# Patient Record
Sex: FEMALE | Race: WHITE | Employment: FULL TIME | URBAN - METROPOLITAN AREA
[De-identification: names, ages, dates, MRNs, and addresses within clinical notes are randomized per-mention and may not be internally consistent; named-entity substitution may affect disease eponyms.]

---

## 2017-02-10 ENCOUNTER — TELEPHONE (OUTPATIENT)
Dept: INTERNAL MEDICINE CLINIC | Facility: CLINIC | Age: 47
End: 2017-02-10

## 2017-02-10 DIAGNOSIS — R74.8 ELEVATED LIVER ENZYMES: Primary | ICD-10-CM

## 2017-02-25 ENCOUNTER — LAB ENCOUNTER (OUTPATIENT)
Dept: LAB | Age: 47
End: 2017-02-25
Attending: FAMILY MEDICINE
Payer: COMMERCIAL

## 2017-02-25 DIAGNOSIS — R74.8 ELEVATED LIVER ENZYMES: ICD-10-CM

## 2017-02-25 LAB
ALT SERPL-CCNC: 134 U/L (ref 14–54)
AST SERPL-CCNC: 84 U/L (ref 15–41)
CHOLEST SMN-MCNC: 264 MG/DL (ref ?–200)
HDLC SERPL-MCNC: 78 MG/DL (ref 45–?)
HDLC SERPL: 3.38 {RATIO} (ref ?–4.44)
LDLC SERPL CALC-MCNC: 162 MG/DL (ref ?–130)
NONHDLC SERPL-MCNC: 186 MG/DL (ref ?–130)
TRIGLYCERIDES: 119 MG/DL (ref ?–150)
VLDL: 24 MG/DL (ref 5–40)

## 2017-02-25 PROCEDURE — 84450 TRANSFERASE (AST) (SGOT): CPT

## 2017-02-25 PROCEDURE — 80061 LIPID PANEL: CPT

## 2017-02-25 PROCEDURE — 36415 COLL VENOUS BLD VENIPUNCTURE: CPT

## 2017-02-25 PROCEDURE — 84460 ALANINE AMINO (ALT) (SGPT): CPT

## 2017-04-03 PROBLEM — K76.0 FATTY LIVER: Status: ACTIVE | Noted: 2017-04-03

## 2017-04-15 ENCOUNTER — APPOINTMENT (OUTPATIENT)
Dept: LAB | Age: 47
End: 2017-04-15
Payer: COMMERCIAL

## 2017-04-15 DIAGNOSIS — R74.01 ELEVATED TRANSAMINASE LEVEL: ICD-10-CM

## 2017-04-15 PROCEDURE — 83516 IMMUNOASSAY NONANTIBODY: CPT

## 2017-04-15 PROCEDURE — 82390 ASSAY OF CERULOPLASMIN: CPT

## 2017-04-15 PROCEDURE — 83540 ASSAY OF IRON: CPT

## 2017-04-15 PROCEDURE — 83550 IRON BINDING TEST: CPT

## 2017-04-15 PROCEDURE — 36415 COLL VENOUS BLD VENIPUNCTURE: CPT

## 2017-04-15 PROCEDURE — 86038 ANTINUCLEAR ANTIBODIES: CPT

## 2017-04-15 PROCEDURE — 82728 ASSAY OF FERRITIN: CPT

## 2017-04-15 PROCEDURE — 84432 ASSAY OF THYROGLOBULIN: CPT

## 2017-05-08 ENCOUNTER — TELEPHONE (OUTPATIENT)
Dept: INTERNAL MEDICINE CLINIC | Facility: CLINIC | Age: 47
End: 2017-05-08

## 2017-05-08 DIAGNOSIS — E78.00 HYPERCHOLESTEREMIA: ICD-10-CM

## 2017-05-08 DIAGNOSIS — R74.8 ELEVATED LIVER ENZYMES: Primary | ICD-10-CM

## 2017-05-08 DIAGNOSIS — E03.9 HYPOTHYROIDISM, UNSPECIFIED TYPE: ICD-10-CM

## 2017-05-09 NOTE — TELEPHONE ENCOUNTER
Pt informed Wants to know if can repeat TSH when having lipids done in 3 months? Will be doing hepatic panel in a week for Dr Marla Ríos.

## 2017-05-13 ENCOUNTER — APPOINTMENT (OUTPATIENT)
Dept: LAB | Age: 47
End: 2017-05-13
Attending: INTERNAL MEDICINE
Payer: COMMERCIAL

## 2017-05-13 DIAGNOSIS — R74.8 ELEVATED LIVER ENZYMES: ICD-10-CM

## 2017-05-13 DIAGNOSIS — R74.01 ELEVATED TRANSAMINASE LEVEL: ICD-10-CM

## 2017-05-13 PROCEDURE — 36415 COLL VENOUS BLD VENIPUNCTURE: CPT

## 2017-05-13 PROCEDURE — 82728 ASSAY OF FERRITIN: CPT

## 2017-05-13 PROCEDURE — 80076 HEPATIC FUNCTION PANEL: CPT

## 2017-07-08 ENCOUNTER — HOSPITAL ENCOUNTER (OUTPATIENT)
Dept: MAMMOGRAPHY | Age: 47
Discharge: HOME | End: 2017-07-08
Attending: OBSTETRICS & GYNECOLOGY
Payer: COMMERCIAL

## 2017-07-08 DIAGNOSIS — Z12.31 VISIT FOR SCREENING MAMMOGRAM: ICD-10-CM

## 2017-07-08 PROCEDURE — 77067 SCR MAMMO BI INCL CAD: CPT | Performed by: OBSTETRICS & GYNECOLOGY

## 2017-07-08 PROCEDURE — 77063 BREAST TOMOSYNTHESIS BI: CPT | Performed by: OBSTETRICS & GYNECOLOGY

## 2017-07-10 ENCOUNTER — OFFICE VISIT (OUTPATIENT)
Dept: INTERNAL MEDICINE CLINIC | Facility: CLINIC | Age: 47
End: 2017-07-10

## 2017-07-10 VITALS
SYSTOLIC BLOOD PRESSURE: 120 MMHG | DIASTOLIC BLOOD PRESSURE: 82 MMHG | OXYGEN SATURATION: 98 % | HEART RATE: 71 BPM | WEIGHT: 180 LBS | BODY MASS INDEX: 32 KG/M2 | TEMPERATURE: 99 F | RESPIRATION RATE: 12 BRPM

## 2017-07-10 DIAGNOSIS — H65.01 RIGHT ACUTE SEROUS OTITIS MEDIA, RECURRENCE NOT SPECIFIED: Primary | ICD-10-CM

## 2017-07-10 DIAGNOSIS — R09.82 POST-NASAL DRIP: ICD-10-CM

## 2017-07-10 PROCEDURE — 99213 OFFICE O/P EST LOW 20 MIN: CPT | Performed by: FAMILY MEDICINE

## 2017-07-10 RX ORDER — AZITHROMYCIN 250 MG/1
TABLET, FILM COATED ORAL
Qty: 6 TABLET | Refills: 0 | Status: SHIPPED | OUTPATIENT
Start: 2017-07-10 | End: 2017-12-21

## 2017-07-10 NOTE — PROGRESS NOTES
CHIEF COMPLAINT:   Patient presents with:  Ear Pain: Bilateral ear pain and sore throat since Saturday (7/8/17). No fever. Using Advil. Last Thursday and Friday with headaches.        HPI:   Keri Cuevas is a 48year old female who presents for upper bunionectomy  3/2014: DANIELE BIOPSY STEREOTACTIC NODULE 1 SITE RIGHT  3/2014: DANIELE BIOPSY STEREOTACTIC NODULE 2 SITE BILAT  No date: OTHER SURGICAL HISTORY      Comment: Cervix Cryosurgery, d/t abnormal pap  No date: TONSILLECTOMY      Smoking status: Former S Prescriptions Disp Refills    azithromycin (ZITHROMAX Z-HARLEY) 250 MG Oral Tab 6 tablet 0      Sig: Take two tablets by mouth today, then one tablet daily. Imaging & Consults:  None  Pt to start the above medication. Motrin for pain.  Claritin for t

## 2017-07-17 RX ORDER — LEVOTHYROXINE SODIUM 0.1 MG/1
TABLET ORAL
Qty: 90 TABLET | Refills: 1 | Status: SHIPPED | OUTPATIENT
Start: 2017-07-17 | End: 2017-09-14 | Stop reason: DRUGHIGH

## 2017-08-31 ENCOUNTER — APPOINTMENT (OUTPATIENT)
Dept: LAB | Age: 47
End: 2017-08-31
Attending: FAMILY MEDICINE
Payer: COMMERCIAL

## 2017-08-31 DIAGNOSIS — E03.9 HYPOTHYROIDISM, UNSPECIFIED TYPE: ICD-10-CM

## 2017-08-31 DIAGNOSIS — E78.00 HYPERCHOLESTEREMIA: ICD-10-CM

## 2017-08-31 DIAGNOSIS — R74.8 ELEVATED LIVER ENZYMES: ICD-10-CM

## 2017-08-31 DIAGNOSIS — K76.0 FATTY LIVER: ICD-10-CM

## 2017-08-31 LAB
ALBUMIN SERPL-MCNC: 4.1 G/DL (ref 3.5–4.8)
ALP LIVER SERPL-CCNC: 51 U/L (ref 39–100)
ALT SERPL-CCNC: 86 U/L (ref 14–54)
AST SERPL-CCNC: 42 U/L (ref 15–41)
BILIRUB DIRECT SERPL-MCNC: 0.1 MG/DL (ref 0.1–0.5)
BILIRUB SERPL-MCNC: 0.8 MG/DL (ref 0.1–2)
CHOLEST SMN-MCNC: 259 MG/DL (ref ?–200)
HDLC SERPL-MCNC: 78 MG/DL (ref 45–?)
HDLC SERPL: 3.32 {RATIO} (ref ?–4.44)
LDLC SERPL CALC-MCNC: 164 MG/DL (ref ?–130)
LDLC SERPL-MCNC: 17 MG/DL (ref 5–40)
M PROTEIN MFR SERPL ELPH: 7.3 G/DL (ref 6.1–8.3)
NONHDLC SERPL-MCNC: 181 MG/DL (ref ?–130)
TRIGLYCERIDES: 87 MG/DL (ref ?–150)
TSI SER-ACNC: 5.38 MIU/ML (ref 0.35–5.5)

## 2017-08-31 PROCEDURE — 80076 HEPATIC FUNCTION PANEL: CPT

## 2017-08-31 PROCEDURE — 36415 COLL VENOUS BLD VENIPUNCTURE: CPT

## 2017-08-31 PROCEDURE — 80061 LIPID PANEL: CPT

## 2017-08-31 PROCEDURE — 84443 ASSAY THYROID STIM HORMONE: CPT

## 2017-09-14 ENCOUNTER — TELEPHONE (OUTPATIENT)
Dept: INTERNAL MEDICINE CLINIC | Facility: CLINIC | Age: 47
End: 2017-09-14

## 2017-09-14 DIAGNOSIS — E03.9 PRIMARY HYPOTHYROIDISM: Primary | ICD-10-CM

## 2017-09-14 RX ORDER — LEVOTHYROXINE SODIUM 112 UG/1
112 TABLET ORAL
Qty: 30 TABLET | Refills: 1 | Status: SHIPPED | OUTPATIENT
Start: 2017-09-14 | End: 2017-11-08

## 2017-09-14 NOTE — TELEPHONE ENCOUNTER
Patient informed of lab result and order of 9/2017, she verbalized understanding, new dosage of levothyroxine sent to [facility]. Patient verbalized understanding

## 2017-10-05 ENCOUNTER — TELEPHONE (OUTPATIENT)
Dept: INTERNAL MEDICINE CLINIC | Facility: CLINIC | Age: 47
End: 2017-10-05

## 2017-10-05 NOTE — TELEPHONE ENCOUNTER
Patient needs labs done again for cholesterol and thyroid and the orders are in. Patient would like to know does she need to continue taking medication for 60 days.  Please call patient back

## 2017-11-10 RX ORDER — LEVOTHYROXINE SODIUM 112 UG/1
112 TABLET ORAL
Qty: 30 TABLET | Refills: 0 | Status: SHIPPED | OUTPATIENT
Start: 2017-11-10 | End: 2017-11-14 | Stop reason: DRUGHIGH

## 2017-11-11 ENCOUNTER — APPOINTMENT (OUTPATIENT)
Dept: LAB | Age: 47
End: 2017-11-11
Attending: FAMILY MEDICINE
Payer: COMMERCIAL

## 2017-11-11 DIAGNOSIS — E03.9 PRIMARY HYPOTHYROIDISM: ICD-10-CM

## 2017-11-11 PROCEDURE — 36415 COLL VENOUS BLD VENIPUNCTURE: CPT

## 2017-11-11 PROCEDURE — 84443 ASSAY THYROID STIM HORMONE: CPT

## 2017-11-13 ENCOUNTER — TELEPHONE (OUTPATIENT)
Dept: INTERNAL MEDICINE CLINIC | Facility: CLINIC | Age: 47
End: 2017-11-13

## 2017-11-13 DIAGNOSIS — E03.9 HYPOTHYROIDISM, UNSPECIFIED TYPE: Primary | ICD-10-CM

## 2017-11-14 ENCOUNTER — TELEPHONE (OUTPATIENT)
Dept: INTERNAL MEDICINE CLINIC | Facility: CLINIC | Age: 47
End: 2017-11-14

## 2017-11-14 RX ORDER — LEVOTHYROXINE SODIUM 0.1 MG/1
100 TABLET ORAL DAILY
Qty: 30 TABLET | Refills: 1 | Status: SHIPPED | OUTPATIENT
Start: 2017-11-14 | End: 2018-01-02

## 2017-11-14 NOTE — TELEPHONE ENCOUNTER
Please callback to discuss dosage decision on thyroid med from Dr Gregory Meraz from last night conversation.   Patient has not taken it yet today

## 2017-11-14 NOTE — TELEPHONE ENCOUNTER
----- Message from Mitra Saldaña MD sent at 11/12/2017  8:19 PM CST -----  Now the dose is a bit high  Any tremors, palpitations?

## 2018-01-24 ENCOUNTER — HOSPITAL ENCOUNTER (OUTPATIENT)
Dept: GENERAL RADIOLOGY | Age: 48
Discharge: HOME | End: 2018-01-24
Attending: FAMILY MEDICINE
Payer: COMMERCIAL

## 2018-01-24 ENCOUNTER — OFFICE VISIT (OUTPATIENT)
Dept: INTERNAL MEDICINE CLINIC | Facility: CLINIC | Age: 48
End: 2018-01-24

## 2018-01-24 ENCOUNTER — TELEPHONE (OUTPATIENT)
Dept: INTERNAL MEDICINE CLINIC | Facility: CLINIC | Age: 48
End: 2018-01-24

## 2018-01-24 VITALS
TEMPERATURE: 98 F | RESPIRATION RATE: 16 BRPM | WEIGHT: 181.25 LBS | BODY MASS INDEX: 32 KG/M2 | OXYGEN SATURATION: 95 % | SYSTOLIC BLOOD PRESSURE: 114 MMHG | HEART RATE: 83 BPM | DIASTOLIC BLOOD PRESSURE: 82 MMHG

## 2018-01-24 DIAGNOSIS — M25.561 ACUTE PAIN OF RIGHT KNEE: ICD-10-CM

## 2018-01-24 DIAGNOSIS — M25.561 ACUTE PAIN OF RIGHT KNEE: Primary | ICD-10-CM

## 2018-01-24 PROCEDURE — 99213 OFFICE O/P EST LOW 20 MIN: CPT | Performed by: FAMILY MEDICINE

## 2018-01-24 PROCEDURE — 73560 X-RAY EXAM OF KNEE 1 OR 2: CPT | Performed by: FAMILY MEDICINE

## 2018-01-24 RX ORDER — HYDROCODONE BITARTRATE AND ACETAMINOPHEN 7.5; 325 MG/1; MG/1
1 TABLET ORAL EVERY 6 HOURS PRN
Qty: 20 TABLET | Refills: 0 | Status: SHIPPED | OUTPATIENT
Start: 2018-01-24 | End: 2018-10-30 | Stop reason: ALTCHOICE

## 2018-01-24 NOTE — PROGRESS NOTES
Patient presents with:  Knee Pain: Right knee pain since yesterday afternoon; right knee popped at work. Icing area and using Ibuprofen 600mg q 6 hrs. HPI:   Brina Dobbs is a 50year old female present with complain of right knee pain.   Onset: Yaquelin Angel rashes  RESPIRATORY: denies shortness of breath with exertion  CARDIOVASCULAR: denies chest pain on exertion  GI: denies abdominal pain and denies heartburn  NEURO: denies headaches  Musculoskeletal:  pain as described above.  See HPI     EXAM:   /82

## 2018-01-25 PROBLEM — M22.41 CHONDROMALACIA OF RIGHT PATELLA: Status: ACTIVE | Noted: 2018-01-25

## 2018-01-25 PROBLEM — M25.461 EFFUSION OF RIGHT KNEE: Status: ACTIVE | Noted: 2018-01-25

## 2018-02-04 ENCOUNTER — APPOINTMENT (OUTPATIENT)
Dept: ULTRASOUND IMAGING | Age: 48
End: 2018-02-04
Attending: FAMILY MEDICINE
Payer: COMMERCIAL

## 2018-02-04 ENCOUNTER — HOSPITAL ENCOUNTER (OUTPATIENT)
Age: 48
Discharge: HOME | End: 2018-02-04
Attending: FAMILY MEDICINE
Payer: COMMERCIAL

## 2018-02-04 VITALS
HEART RATE: 68 BPM | DIASTOLIC BLOOD PRESSURE: 73 MMHG | OXYGEN SATURATION: 97 % | TEMPERATURE: 99 F | RESPIRATION RATE: 16 BRPM | SYSTOLIC BLOOD PRESSURE: 128 MMHG

## 2018-02-04 DIAGNOSIS — M25.461 EFFUSION OF RIGHT KNEE: Primary | ICD-10-CM

## 2018-02-04 DIAGNOSIS — M79.89 RIGHT LEG SWELLING: ICD-10-CM

## 2018-02-04 LAB
#LYMPHOCYTE IC: 1.8 X10ˆ3/UL (ref 0.9–3.2)
#MXD IC: 0.5 X10ˆ3/UL (ref 0.1–1)
#NEUTROPHIL IC: 4 X10ˆ3/UL (ref 1.3–6.7)
CREAT SERPL-MCNC: 0.7 MG/DL (ref 0.55–1.02)
GLUCOSE BLD-MCNC: 79 MG/DL (ref 70–99)
HCT IC: 40.7 % (ref 37–54)
HGB IC: 13.4 G/DL (ref 11.7–16)
ISTAT BLOOD GAS TCO2: 25 MMOL/L (ref 22–32)
ISTAT BUN: 8 MG/DL (ref 8–20)
ISTAT CHLORIDE: 102 MMOL/L (ref 101–111)
ISTAT HEMATOCRIT: 41 % (ref 34–50)
ISTAT IONIZED CALCIUM: 1.14 MMOL/L (ref 1.12–1.32)
ISTAT POTASSIUM: 3.8 MMOL/L (ref 3.6–5.1)
ISTAT SODIUM: 138 MMOL/L (ref 136–144)
LYMPHOCYTES NFR BLD AUTO: 28.3 %
MCH IC: 31.2 PG (ref 27–33.2)
MCHC IC: 32.9 G/DL (ref 31–37)
MCV IC: 94.7 FL (ref 81–100)
MIXED CELL %: 8.4 %
NEUTROPHILS NFR BLD AUTO: 63.3 %
PLT IC: 196 X10ˆ3/UL (ref 150–450)
RBC IC: 4.3 X10ˆ6/UL (ref 3.8–5.1)
WBC IC: 6.3 X10ˆ3/UL (ref 4–13)

## 2018-02-04 PROCEDURE — 80047 BASIC METABLC PNL IONIZED CA: CPT

## 2018-02-04 PROCEDURE — 36415 COLL VENOUS BLD VENIPUNCTURE: CPT

## 2018-02-04 PROCEDURE — 93971 EXTREMITY STUDY: CPT | Performed by: FAMILY MEDICINE

## 2018-02-04 PROCEDURE — 99214 OFFICE O/P EST MOD 30 MIN: CPT

## 2018-02-04 PROCEDURE — 85025 COMPLETE CBC W/AUTO DIFF WBC: CPT | Performed by: FAMILY MEDICINE

## 2018-02-04 NOTE — ED PROVIDER NOTES
Patient Seen in: [facility]    History   Patient presents with:  Knee Pain    Stated Complaint: RIGHT KNEE AND SWOLLEN    HPI  51 yo F here with complaints of R knee pain and swelling and swelling of the whole leg - appears \"li All other systems reviewed and negative except as noted above.     Physical Exam   ED Triage Vitals [02/04/18 1126]  BP: 130/74  Pulse: 63  Resp: 18  Temp: 98.8 °F (37.1 °C)  Temp src: Oral  SpO2: 97 %  O2 Device: None (Room air)    Current:/73 dislocation.   Dictated by: Leah Wallace MD on 1/24/2018 at 13:51     Approved by: Leah Wallace MD            Us Venous Doppler Leg Right - Diag Img (cpt=93971)    Result Date: 2/4/2018  PROCEDURE:  US VENOUS DOPPLER LEG RIGHT - DIAG IMG (CPT=93971) Paulo Parrish [address] [address]  [phone]    In 2 days  For reassessment of symptoms         Medications Prescribed:  Discharge Medication List as of 2/4/2018 12:56 PM

## 2018-02-04 NOTE — ED INITIAL ASSESSMENT (HPI)
Right knee popped when standing up ten days ago. Knee was drained by Ortho MD six days ago and given Cortisone shot. Here today due to worsening swelling.

## 2018-03-10 ENCOUNTER — APPOINTMENT (OUTPATIENT)
Dept: LAB | Age: 48
End: 2018-03-10
Attending: SPECIALIST
Payer: COMMERCIAL

## 2018-03-10 DIAGNOSIS — E03.9 HYPOTHYROIDISM, UNSPECIFIED TYPE: ICD-10-CM

## 2018-03-10 LAB
FREE T4: 1 NG/DL (ref 0.9–1.8)
TSI SER-ACNC: 1.83 MIU/ML (ref 0.35–5.5)

## 2018-03-10 PROCEDURE — 36415 COLL VENOUS BLD VENIPUNCTURE: CPT

## 2018-03-10 PROCEDURE — 84443 ASSAY THYROID STIM HORMONE: CPT

## 2018-03-10 PROCEDURE — 84439 ASSAY OF FREE THYROXINE: CPT

## 2018-04-05 DIAGNOSIS — E03.9 HYPOTHYROIDISM, UNSPECIFIED TYPE: ICD-10-CM

## 2018-04-06 RX ORDER — LEVOTHYROXINE SODIUM 0.1 MG/1
100 TABLET ORAL DAILY
Qty: 90 TABLET | Refills: 0 | OUTPATIENT
Start: 2018-04-06 | End: 2019-04-01

## 2018-04-06 NOTE — TELEPHONE ENCOUNTER
Pt sees Dr. Moira Rangel in Endocrinology and she has refilled this in the past.    Should we forward request to her?

## 2018-04-23 PROBLEM — S83.231A COMPLEX TEAR OF MEDIAL MENISCUS OF RIGHT KNEE AS CURRENT INJURY, INITIAL ENCOUNTER: Status: ACTIVE | Noted: 2018-04-23

## 2018-05-09 ENCOUNTER — TELEPHONE (OUTPATIENT)
Dept: INTERNAL MEDICINE CLINIC | Facility: CLINIC | Age: 48
End: 2018-05-09

## 2018-05-09 DIAGNOSIS — E03.9 HYPOTHYROIDISM, UNSPECIFIED TYPE: ICD-10-CM

## 2018-05-09 RX ORDER — LEVOTHYROXINE SODIUM 0.1 MG/1
100 TABLET ORAL DAILY
Qty: 90 TABLET | Refills: 1 | Status: SHIPPED | OUTPATIENT
Start: 2018-05-09 | End: 2018-10-18

## 2018-05-09 NOTE — TELEPHONE ENCOUNTER
Saw Endo Dr Justo Taylor as a second opinion only who did labs and refilled her  Levothyroxine 1 time 90 days No plans to go back to see her . ( notes in Epic ) Told additional refills from PCP [facility] with last request

## 2018-07-01 PROBLEM — S83.231D COMPLEX TEAR OF MEDIAL MENISCUS OF RIGHT KNEE AS CURRENT INJURY, SUBSEQUENT ENCOUNTER: Status: ACTIVE | Noted: 2018-04-23

## 2018-07-01 PROBLEM — S83.511D RUPTURE OF ANTERIOR CRUCIATE LIGAMENT OF RIGHT KNEE, SUBSEQUENT ENCOUNTER: Status: ACTIVE | Noted: 2018-07-01

## 2018-07-24 ENCOUNTER — HOSPITAL ENCOUNTER (OUTPATIENT)
Dept: MAMMOGRAPHY | Age: 48
Discharge: HOME | End: 2018-07-24
Attending: NURSE PRACTITIONER
Payer: COMMERCIAL

## 2018-07-24 DIAGNOSIS — Z12.31 VISIT FOR SCREENING MAMMOGRAM: ICD-10-CM

## 2018-07-24 PROCEDURE — 77067 SCR MAMMO BI INCL CAD: CPT | Performed by: NURSE PRACTITIONER

## 2018-10-18 ENCOUNTER — TELEPHONE (OUTPATIENT)
Dept: INTERNAL MEDICINE CLINIC | Facility: CLINIC | Age: 48
End: 2018-10-18

## 2018-10-18 DIAGNOSIS — E03.9 HYPOTHYROIDISM, UNSPECIFIED TYPE: ICD-10-CM

## 2018-10-18 RX ORDER — LEVOTHYROXINE SODIUM 0.1 MG/1
100 TABLET ORAL DAILY
Qty: 90 TABLET | Refills: 1 | Status: SHIPPED | OUTPATIENT
Start: 2018-10-18 | End: 2019-08-19

## 2018-10-18 RX ORDER — LEVOTHYROXINE SODIUM 0.1 MG/1
100 TABLET ORAL DAILY
Qty: 30 TABLET | Refills: 0 | Status: SHIPPED | OUTPATIENT
Start: 2018-10-18 | End: 2018-10-18

## 2018-10-18 NOTE — TELEPHONE ENCOUNTER
Patient called stating that she [facility] for Levothyroxine and she hasn't received it.  Its been canceled by patient because they told her they mailed it but she hasn't received it yet, so she would like to speak to the nu

## 2018-10-30 ENCOUNTER — OFFICE VISIT (OUTPATIENT)
Dept: INTERNAL MEDICINE CLINIC | Facility: CLINIC | Age: 48
End: 2018-10-30
Payer: COMMERCIAL

## 2018-10-30 VITALS
DIASTOLIC BLOOD PRESSURE: 80 MMHG | TEMPERATURE: 98 F | HEART RATE: 78 BPM | WEIGHT: 190 LBS | BODY MASS INDEX: 33.66 KG/M2 | HEIGHT: 63 IN | SYSTOLIC BLOOD PRESSURE: 114 MMHG | OXYGEN SATURATION: 97 %

## 2018-10-30 DIAGNOSIS — R14.0 BLOATING: ICD-10-CM

## 2018-10-30 DIAGNOSIS — R23.2 HOT FLASHES: ICD-10-CM

## 2018-10-30 DIAGNOSIS — Z00.00 ROUTINE GENERAL MEDICAL EXAMINATION AT A HEALTH CARE FACILITY: Primary | ICD-10-CM

## 2018-10-30 DIAGNOSIS — K76.0 FATTY LIVER: ICD-10-CM

## 2018-10-30 DIAGNOSIS — Z12.11 ENCOUNTER FOR SCREENING COLONOSCOPY: ICD-10-CM

## 2018-10-30 DIAGNOSIS — R60.0 LEG EDEMA: ICD-10-CM

## 2018-10-30 DIAGNOSIS — Z00.00 LABORATORY EXAMINATION ORDERED AS PART OF A ROUTINE GENERAL MEDICAL EXAMINATION: ICD-10-CM

## 2018-10-30 DIAGNOSIS — R79.89 LOW VITAMIN D LEVEL: ICD-10-CM

## 2018-10-30 DIAGNOSIS — E03.9 HYPOTHYROIDISM, UNSPECIFIED TYPE: ICD-10-CM

## 2018-10-30 PROCEDURE — 99213 OFFICE O/P EST LOW 20 MIN: CPT | Performed by: NURSE PRACTITIONER

## 2018-10-30 PROCEDURE — 99396 PREV VISIT EST AGE 40-64: CPT | Performed by: NURSE PRACTITIONER

## 2018-10-30 NOTE — PROGRESS NOTES
Patient presents with:  Edema: both ankles    HPI:  Pt would like to address bloating, hot flashes, weight gain, ankle swelling R>L since knee surgery, cramping in legs. Pt states for the past few months she does not feel well.  She has been bloated frequen STEREOTACTIC NODULE 1 SITE RIGHT  3/2014   • DANIELE BIOPSY STEREOTACTIC NODULE 2 SITE BILAT  3/2014   • OTHER SURGICAL HISTORY      Cervix Cryosurgery, d/t abnormal pap   • TONSILLECTOMY       MEDICATIONS:    Current Outpatient Medications:  Levothyroxine Sod change in headache pattern. PSYCHE: Denies symptoms of depression or anxiety. HEMATOLOGY: denies hx anemia; denies bruising or excessive bleeding. ENDOCRINE: denies excessive thirst or urination; denies unexpected wt gain or wt loss. ALLERGY/IMM. Dipti Amaya physical activity  Pt states she cannot have flu vaccine due to neomycin allergy- will check with pharmacy in am and notify if we have vaccine without this    2.. Laboratory examination ordered as part of a routine general medical examination  - CBC WITH D

## 2018-10-31 ENCOUNTER — PATIENT MESSAGE (OUTPATIENT)
Dept: INTERNAL MEDICINE CLINIC | Facility: CLINIC | Age: 48
End: 2018-10-31

## 2018-11-03 ENCOUNTER — LAB ENCOUNTER (OUTPATIENT)
Dept: LAB | Age: 48
End: 2018-11-03
Attending: NURSE PRACTITIONER
Payer: COMMERCIAL

## 2018-11-03 DIAGNOSIS — Z00.00 LABORATORY EXAMINATION ORDERED AS PART OF A ROUTINE GENERAL MEDICAL EXAMINATION: ICD-10-CM

## 2018-11-03 DIAGNOSIS — E03.9 HYPOTHYROIDISM, UNSPECIFIED TYPE: ICD-10-CM

## 2018-11-03 DIAGNOSIS — R79.89 LOW VITAMIN D LEVEL: ICD-10-CM

## 2018-11-03 PROCEDURE — 82306 VITAMIN D 25 HYDROXY: CPT

## 2018-11-03 PROCEDURE — 84439 ASSAY OF FREE THYROXINE: CPT

## 2018-11-03 PROCEDURE — 85025 COMPLETE CBC W/AUTO DIFF WBC: CPT

## 2018-11-03 PROCEDURE — 80053 COMPREHEN METABOLIC PANEL: CPT

## 2018-11-03 PROCEDURE — 84443 ASSAY THYROID STIM HORMONE: CPT

## 2018-11-03 PROCEDURE — 36415 COLL VENOUS BLD VENIPUNCTURE: CPT

## 2018-11-03 PROCEDURE — 80061 LIPID PANEL: CPT

## 2018-11-08 ENCOUNTER — TELEPHONE (OUTPATIENT)
Dept: INTERNAL MEDICINE CLINIC | Facility: CLINIC | Age: 48
End: 2018-11-08

## 2018-11-08 DIAGNOSIS — E55.9 VITAMIN D DEFICIENCY: Primary | ICD-10-CM

## 2018-11-08 RX ORDER — ERGOCALCIFEROL 1.25 MG/1
50000 CAPSULE ORAL WEEKLY
Qty: 12 CAPSULE | Refills: 1 | Status: SHIPPED | OUTPATIENT
Start: 2018-11-08 | End: 2019-08-13

## 2018-11-08 NOTE — TELEPHONE ENCOUNTER
----- Message from LITA Mcmahon sent at 11/5/2018  5:06 PM CST -----  Please notify patient, CBC is wnl, thyroid levels normal as well as electrolytes.  However liver enzymes are much higher (AST has tripled and ALT doubled) and cholesterol still

## 2018-11-09 RX ORDER — SPIRONOLACTONE 25 MG/1
25 TABLET ORAL DAILY
Qty: 30 TABLET | Refills: 0 | Status: SHIPPED | OUTPATIENT
Start: 2018-11-09 | End: 2019-11-04

## 2018-11-09 NOTE — TELEPHONE ENCOUNTER
Yes will start spironolactone, sent to pharmacy.  30 days sent then patient to f/u visit regarding symptoms

## 2018-11-17 DIAGNOSIS — E03.9 HYPOTHYROIDISM, UNSPECIFIED TYPE: ICD-10-CM

## 2018-11-19 ENCOUNTER — TELEPHONE (OUTPATIENT)
Dept: INTERNAL MEDICINE CLINIC | Facility: CLINIC | Age: 48
End: 2018-11-19

## 2018-11-19 ENCOUNTER — IMMUNIZATION (OUTPATIENT)
Dept: INTERNAL MEDICINE CLINIC | Facility: CLINIC | Age: 48
End: 2018-11-19
Payer: COMMERCIAL

## 2018-11-19 DIAGNOSIS — Z23 NEED FOR VACCINATION: ICD-10-CM

## 2018-11-19 PROCEDURE — 90471 IMMUNIZATION ADMIN: CPT | Performed by: INTERNAL MEDICINE

## 2018-11-19 PROCEDURE — 90686 IIV4 VACC NO PRSV 0.5 ML IM: CPT | Performed by: INTERNAL MEDICINE

## 2018-11-19 RX ORDER — LEVOTHYROXINE SODIUM 0.1 MG/1
TABLET ORAL
Qty: 30 TABLET | Refills: 0 | Status: SHIPPED | OUTPATIENT
Start: 2018-11-19 | End: 2019-05-13

## 2018-11-23 ENCOUNTER — TELEPHONE (OUTPATIENT)
Dept: INTERNAL MEDICINE CLINIC | Facility: CLINIC | Age: 48
End: 2018-11-23

## 2018-11-28 ENCOUNTER — TELEPHONE (OUTPATIENT)
Dept: INTERNAL MEDICINE CLINIC | Facility: CLINIC | Age: 48
End: 2018-11-28

## 2018-11-28 NOTE — TELEPHONE ENCOUNTER
Pt came in and picked up form because she forgot to sign form and stated she has to have more labs taken tomorrow and she will have it filled out then.

## 2018-12-03 ENCOUNTER — TELEPHONE (OUTPATIENT)
Dept: INTERNAL MEDICINE CLINIC | Facility: CLINIC | Age: 48
End: 2018-12-03

## 2018-12-03 NOTE — TELEPHONE ENCOUNTER
Blood pressure for patient is running high and she would like a nurse callback to triage and discuss further

## 2018-12-03 NOTE — TELEPHONE ENCOUNTER
Pt had bp checked at [facility] and was checked 4 times Electronic BP monitor read high 144/89 133/87 last Thursday Checked at work on Friday 147/89 133/89 ( also electronic)  Today at work 137/91 .  Advised appt to recheck BP in office manually

## 2019-05-02 ENCOUNTER — TELEPHONE (OUTPATIENT)
Dept: INTERNAL MEDICINE CLINIC | Facility: CLINIC | Age: 49
End: 2019-05-02

## 2019-05-13 DIAGNOSIS — E03.9 HYPOTHYROIDISM, UNSPECIFIED TYPE: ICD-10-CM

## 2019-05-13 DIAGNOSIS — Z01.84 IMMUNITY STATUS TESTING: Primary | ICD-10-CM

## 2019-05-13 RX ORDER — LEVOTHYROXINE SODIUM 0.1 MG/1
100 TABLET ORAL
Qty: 90 TABLET | Refills: 0 | Status: SHIPPED | OUTPATIENT
Start: 2019-05-13 | End: 2019-08-19

## 2019-05-13 RX ORDER — LEVOTHYROXINE SODIUM 0.1 MG/1
100 TABLET ORAL
Qty: 30 TABLET | Refills: 0 | Status: SHIPPED | OUTPATIENT
Start: 2019-05-13 | End: 2019-05-13

## 2019-05-13 NOTE — TELEPHONE ENCOUNTER
Patient is requesting a refill on her prescription for LEVOTHYROXINE SODIUM 100 MCG Oral Tab. Patient stated that she only has 6 pills left. She stated that the pharmacy sent over a refill request back in March.  I advised the patient that we never received

## 2019-08-10 ENCOUNTER — APPOINTMENT (OUTPATIENT)
Dept: LAB | Age: 49
End: 2019-08-10
Attending: NURSE PRACTITIONER
Payer: COMMERCIAL

## 2019-08-10 DIAGNOSIS — E55.9 VITAMIN D DEFICIENCY: ICD-10-CM

## 2019-08-10 DIAGNOSIS — Z01.84 IMMUNITY STATUS TESTING: ICD-10-CM

## 2019-08-10 LAB — VIT D+METAB SERPL-MCNC: 20.5 NG/ML (ref 30–100)

## 2019-08-10 PROCEDURE — 36415 COLL VENOUS BLD VENIPUNCTURE: CPT

## 2019-08-10 PROCEDURE — 82306 VITAMIN D 25 HYDROXY: CPT

## 2019-08-13 DIAGNOSIS — E55.9 VITAMIN D DEFICIENCY: Primary | ICD-10-CM

## 2019-08-13 RX ORDER — ERGOCALCIFEROL 1.25 MG/1
50000 CAPSULE ORAL WEEKLY
Qty: 4 CAPSULE | Refills: 5 | Status: SHIPPED | OUTPATIENT
Start: 2019-08-13 | End: 2021-01-06

## 2019-08-17 DIAGNOSIS — E03.9 HYPOTHYROIDISM, UNSPECIFIED TYPE: ICD-10-CM

## 2019-08-19 RX ORDER — LEVOTHYROXINE SODIUM 0.1 MG/1
TABLET ORAL
Qty: 30 TABLET | Refills: 1 | Status: SHIPPED | OUTPATIENT
Start: 2019-08-19 | End: 2020-01-08

## 2019-08-30 PROBLEM — N95.1 VASOMOTOR SYMPTOMS DUE TO MENOPAUSE: Status: ACTIVE | Noted: 2019-08-30

## 2019-08-30 PROBLEM — E55.9 VITAMIN D DEFICIENCY: Status: ACTIVE | Noted: 2019-08-30

## 2019-09-25 ENCOUNTER — TELEPHONE (OUTPATIENT)
Dept: INTERNAL MEDICINE CLINIC | Facility: CLINIC | Age: 49
End: 2019-09-25

## 2019-09-25 NOTE — TELEPHONE ENCOUNTER
Pt called office back and stated her work is offering the flu vaccine but she wasn't sure if it would be ok to get d/t her allergy to neosporin.  Pt stated she was told by a pharmacist several years ago she should not get the flu vaccine due to that allergy

## 2019-09-25 NOTE — TELEPHONE ENCOUNTER
Patient requesting a call back for advice. Stated that she is considering getting the flu vaccine through her job called \"fluzone quadrivalent\". However, patient is allergic to neosporin.       Patient is inquiring if nurses can give her the name of flu v

## 2019-09-28 ENCOUNTER — HOSPITAL ENCOUNTER (OUTPATIENT)
Dept: MAMMOGRAPHY | Age: 49
Discharge: HOME | End: 2019-09-28
Attending: FAMILY MEDICINE
Payer: COMMERCIAL

## 2019-09-28 DIAGNOSIS — Z12.31 BREAST CANCER SCREENING BY MAMMOGRAM: ICD-10-CM

## 2019-09-28 PROCEDURE — 77063 BREAST TOMOSYNTHESIS BI: CPT | Performed by: NURSE PRACTITIONER

## 2019-09-28 PROCEDURE — 77067 SCR MAMMO BI INCL CAD: CPT | Performed by: NURSE PRACTITIONER

## 2019-10-10 ENCOUNTER — HOSPITAL ENCOUNTER (OUTPATIENT)
Dept: ULTRASOUND IMAGING | Age: 49
Discharge: HOME | End: 2019-10-10
Attending: NURSE PRACTITIONER
Payer: COMMERCIAL

## 2019-10-10 ENCOUNTER — HOSPITAL ENCOUNTER (OUTPATIENT)
Dept: MAMMOGRAPHY | Age: 49
Discharge: HOME | End: 2019-10-10
Attending: NURSE PRACTITIONER
Payer: COMMERCIAL

## 2019-10-10 DIAGNOSIS — R92.2 INCONCLUSIVE MAMMOGRAM: ICD-10-CM

## 2019-10-10 PROCEDURE — 76642 ULTRASOUND BREAST LIMITED: CPT | Performed by: NURSE PRACTITIONER

## 2019-10-10 PROCEDURE — 77061 BREAST TOMOSYNTHESIS UNI: CPT | Performed by: NURSE PRACTITIONER

## 2019-10-10 PROCEDURE — 77065 DX MAMMO INCL CAD UNI: CPT | Performed by: NURSE PRACTITIONER

## 2019-12-10 ENCOUNTER — OFFICE VISIT (OUTPATIENT)
Dept: INTERNAL MEDICINE CLINIC | Facility: CLINIC | Age: 49
End: 2019-12-10
Payer: COMMERCIAL

## 2019-12-10 VITALS
HEART RATE: 92 BPM | OXYGEN SATURATION: 91 % | DIASTOLIC BLOOD PRESSURE: 86 MMHG | SYSTOLIC BLOOD PRESSURE: 136 MMHG | RESPIRATION RATE: 13 BRPM | TEMPERATURE: 99 F

## 2019-12-10 DIAGNOSIS — K21.9 GASTROESOPHAGEAL REFLUX DISEASE, ESOPHAGITIS PRESENCE NOT SPECIFIED: ICD-10-CM

## 2019-12-10 DIAGNOSIS — E78.5 HYPERLIPIDEMIA, UNSPECIFIED HYPERLIPIDEMIA TYPE: ICD-10-CM

## 2019-12-10 DIAGNOSIS — E55.9 VITAMIN D DEFICIENCY: ICD-10-CM

## 2019-12-10 DIAGNOSIS — R22.1 LOCALIZED SWELLING, MASS AND LUMP, NECK: Primary | ICD-10-CM

## 2019-12-10 DIAGNOSIS — R05.9 COUGH: ICD-10-CM

## 2019-12-10 PROBLEM — E78.49 OTHER HYPERLIPIDEMIA: Status: ACTIVE | Noted: 2019-12-10

## 2019-12-10 PROBLEM — R05.3 CHRONIC COUGH: Status: ACTIVE | Noted: 2019-12-10

## 2019-12-10 PROCEDURE — 99214 OFFICE O/P EST MOD 30 MIN: CPT | Performed by: FAMILY MEDICINE

## 2019-12-10 RX ORDER — ROSUVASTATIN CALCIUM 5 MG/1
5 TABLET, COATED ORAL NIGHTLY
Qty: 30 TABLET | Refills: 1 | Status: SHIPPED | OUTPATIENT
Start: 2019-12-10 | End: 2020-01-27

## 2019-12-10 NOTE — PROGRESS NOTES
CHIEF COMPLAINT:   Patient presents with:  Lump: R neck area/ x2weeks/ pain whn turning/   Cough: congestion in the am/ 3-6 months      HPI:     Patient reports lump to right side of neck x 1 month.  She notices it when she turns her head, causes a 'pulling 2012   • Rosacea    • Unspecified hypothyroidism 2012      Past Surgical History:   Procedure Laterality Date   • BREAST BIOPSY     •      • COLPOSCOPY, CERVIX W/UPPER ADJACENT VAGINA; W/BIOPSY(S), CERVIX  ,     HPV, nl   • C pharynx is not erythematous. no exudates. Tonsils 1+  NECK:  Right soft supraclavicular mass noted, approximately 5 cm x 3 cm. Supple, non-tender  LUNGS: clear to auscultation bilaterally, no wheezes or rhonchi. Breathing is non labored.   CARDIO: RRR witho

## 2019-12-11 ENCOUNTER — TELEPHONE (OUTPATIENT)
Dept: INTERNAL MEDICINE CLINIC | Facility: CLINIC | Age: 49
End: 2019-12-11

## 2019-12-16 ENCOUNTER — TELEPHONE (OUTPATIENT)
Dept: INTERNAL MEDICINE CLINIC | Facility: CLINIC | Age: 49
End: 2019-12-16

## 2019-12-16 DIAGNOSIS — R59.0 ENLARGED LYMPH NODE IN NECK: Primary | ICD-10-CM

## 2019-12-16 NOTE — TELEPHONE ENCOUNTER
Spoke with pt sates already aware od lab result and she is on Crestor.  Pt asking for result of chest xray and US head/neck done over the weekend at [facility]

## 2019-12-16 NOTE — TELEPHONE ENCOUNTER
Chest x ray wnl. US of neck- negative in the area where  Pt's concern is. But an incidental lymph node was found. Discussed result with Dr. Jennifer Weinstein and he feels she should see Dr. Alex Villalobos for a consult.

## 2019-12-16 NOTE — TELEPHONE ENCOUNTER
Patient calling for test results.  Informed Molly Skylarpillo still has to review results and nurse will call to review with her; she would like call today if possible

## 2019-12-16 NOTE — TELEPHONE ENCOUNTER
Chol up 275, Trigs up 173 and Ldl 177. These labs are all up since the labs which were done 11/2018. Pt needs crestor 5 mg one daily #30 with 3 refills.  Recheck lipids, AST and ALT in 3 months

## 2019-12-26 ENCOUNTER — TELEPHONE (OUTPATIENT)
Dept: INTERNAL MEDICINE CLINIC | Facility: CLINIC | Age: 49
End: 2019-12-26

## 2019-12-26 NOTE — TELEPHONE ENCOUNTER
Patient at office of Dr Partha Middleton fax [phone] their office needs US of Neck asap if possible please fax

## 2020-01-08 DIAGNOSIS — E03.9 HYPOTHYROIDISM, UNSPECIFIED TYPE: ICD-10-CM

## 2020-01-08 RX ORDER — LEVOTHYROXINE SODIUM 0.1 MG/1
100 TABLET ORAL
Qty: 30 TABLET | Refills: 0 | Status: SHIPPED | OUTPATIENT
Start: 2020-01-08 | End: 2020-02-20

## 2020-01-08 NOTE — TELEPHONE ENCOUNTER
Spoke to pt. She asked that we sign off on order for Thyroid check and send a short supply of medication. Pt is going to [facility], since it is cheaper. Pharmacy: [facility] off or [address] in [place].     Levothyroxine 100 MCG failed protocol due to  [facility]

## 2020-01-08 NOTE — TELEPHONE ENCOUNTER
Patient called in to request refill of:  LEVOTHYROXINE SODIUM 100 MCG Oral Tab      To be sent to   [facility], [facility] [phone], [phone]

## 2020-01-27 ENCOUNTER — TELEPHONE (OUTPATIENT)
Dept: INTERNAL MEDICINE CLINIC | Facility: CLINIC | Age: 50
End: 2020-01-27

## 2020-01-27 DIAGNOSIS — E78.5 HYPERLIPIDEMIA, UNSPECIFIED HYPERLIPIDEMIA TYPE: ICD-10-CM

## 2020-01-27 RX ORDER — ROSUVASTATIN CALCIUM 5 MG/1
5 TABLET, COATED ORAL NIGHTLY
Qty: 90 TABLET | Refills: 0 | Status: SHIPPED | OUTPATIENT
Start: 2020-01-27 | End: 2020-12-05

## 2020-01-27 NOTE — TELEPHONE ENCOUNTER
Patient was only given a 30 day supply of her prescription for Rosuvastatin Calcium (CRESTOR) 5 MG Oral Tab. Patient wants to know if Oscar Newell can call in [facility]. Please advise.

## 2020-02-16 LAB — TSH W/REFLEX TO FT4: 4.2 MIU/L

## 2020-02-20 DIAGNOSIS — E03.9 HYPOTHYROIDISM, UNSPECIFIED TYPE: ICD-10-CM

## 2020-02-20 RX ORDER — LEVOTHYROXINE SODIUM 0.1 MG/1
100 TABLET ORAL
Qty: 90 TABLET | Refills: 0 | Status: SHIPPED | OUTPATIENT
Start: 2020-02-20 | End: 2020-06-23

## 2020-02-20 NOTE — TELEPHONE ENCOUNTER
Patient calling in requesting a refill for RX Levothyroxine Sodium 100 MCG Oral Tab. Pt stated she completed her TSH and results have came in.     Pharmacy:  [facility], [facility] [phone], [phone]

## 2020-02-20 NOTE — TELEPHONE ENCOUNTER
Levothyroxine Sodium 100 MCG Oral Tab    Passed Protocol    Last OV relevant to medication: 12/10/2019  Last refill date: 1/8/2020     #/refills: #30 w/ 0 refills   When pt was asked to return for OV: none noted   Upcoming appt/reason: no future appointmen

## 2020-05-06 ENCOUNTER — TELEPHONE (OUTPATIENT)
Dept: INTERNAL MEDICINE CLINIC | Facility: CLINIC | Age: 50
End: 2020-05-06

## 2020-05-06 DIAGNOSIS — E55.9 VITAMIN D DEFICIENCY: Primary | ICD-10-CM

## 2020-05-06 NOTE — TELEPHONE ENCOUNTER
Patient called requesting lab orders for Vitamin D. Stated that she is due to recheck her levels. She also just finished the last of her prescription for Vitamin D and is not sure if she should continue taking.

## 2020-05-19 ENCOUNTER — TELEPHONE (OUTPATIENT)
Dept: INTERNAL MEDICINE CLINIC | Facility: CLINIC | Age: 50
End: 2020-05-19

## 2020-05-19 DIAGNOSIS — Z23 NEED FOR TDAP VACCINATION: Primary | ICD-10-CM

## 2020-05-19 NOTE — TELEPHONE ENCOUNTER
Patient requesting TDAP vaccine d/t  in family - per patient insurance will cover injection from pcp    1/Orders needed if appropriate    2/When should NV be scheduled ?  Pt wishes to come at same time as , Eliceo Galvez ([facility] pt)    Call pt

## 2020-05-21 ENCOUNTER — NURSE ONLY (OUTPATIENT)
Dept: INTERNAL MEDICINE CLINIC | Facility: CLINIC | Age: 50
End: 2020-05-21
Payer: COMMERCIAL

## 2020-05-21 PROCEDURE — 90715 TDAP VACCINE 7 YRS/> IM: CPT | Performed by: FAMILY MEDICINE

## 2020-05-21 PROCEDURE — 90471 IMMUNIZATION ADMIN: CPT | Performed by: FAMILY MEDICINE

## 2020-06-23 DIAGNOSIS — E03.9 HYPOTHYROIDISM, UNSPECIFIED TYPE: ICD-10-CM

## 2020-06-23 RX ORDER — LEVOTHYROXINE SODIUM 0.1 MG/1
TABLET ORAL
Qty: 90 TABLET | Refills: 0 | Status: SHIPPED | OUTPATIENT
Start: 2020-06-23 | End: 2020-09-03

## 2020-06-23 NOTE — TELEPHONE ENCOUNTER
Levothyroxine 100 mcg   Last OV relevant to medication: 10-30-18  Last refill date: 2-20-20 #/refills: 0  When pt was asked to return for OV: 1 yr  Upcoming appt/reason: none  Recent labs: 2-15-20: TSH W Reflex

## 2020-06-30 ENCOUNTER — TELEPHONE (OUTPATIENT)
Dept: INTERNAL MEDICINE CLINIC | Facility: CLINIC | Age: 50
End: 2020-06-30

## 2020-06-30 NOTE — TELEPHONE ENCOUNTER
Pt prescribed sluconazole 150mg OD x3 days. Was advised by derm to verify ok to take with levo and rosuvastatin. Pt to start sluconazole tonight or tmrw morning.      Also asking for Vit D results

## 2020-06-30 NOTE — TELEPHONE ENCOUNTER
Please review previous message and advise.     -vitD labs results released via [employer]. Will go over results w pt when she is called back.

## 2020-06-30 NOTE — TELEPHONE ENCOUNTER
Pt informed and also pt inquire if should have lipid lab order completed as well. Per notes from January pt was to repeated in 3 months. Pt advised to complete at earliest convenience, pt agreeable.

## 2020-08-03 PROBLEM — S83.251S: Status: ACTIVE | Noted: 2020-08-03

## 2020-08-03 PROBLEM — M23.51 OLD COMPLETE ACL TEAR, RIGHT: Status: ACTIVE | Noted: 2020-08-03

## 2020-08-11 ENCOUNTER — TELEPHONE (OUTPATIENT)
Dept: INTERNAL MEDICINE CLINIC | Facility: CLINIC | Age: 50
End: 2020-08-11

## 2020-08-11 NOTE — TELEPHONE ENCOUNTER
Pt with insect bites since Saturday on both legs up to knees. Bilateral foot swelling. Bites were itchy yesterday and not so sure its due to mosquitos. Pt tried ice, Advil and Claritin with little relief. Thinks she may possibly need a steroid?   Appoint

## 2020-08-11 NOTE — TELEPHONE ENCOUNTER
Unfortunately, Pt needs an appointment. It can be a video visit, but one of us need to see what is going on.

## 2020-08-11 NOTE — TELEPHONE ENCOUNTER
Patient calling in asking to speak with the Nurse. Pt stated she has insect bites on her feet and now both of her feet are swollen. Please call pt to discuss further.

## 2020-09-03 DIAGNOSIS — E03.9 HYPOTHYROIDISM, UNSPECIFIED TYPE: ICD-10-CM

## 2020-09-03 RX ORDER — LEVOTHYROXINE SODIUM 0.1 MG/1
TABLET ORAL
Qty: 90 TABLET | Refills: 0 | Status: SHIPPED | OUTPATIENT
Start: 2020-09-03 | End: 2020-12-05

## 2020-09-03 NOTE — TELEPHONE ENCOUNTER
Levothyroxine 100 mcg  Last OV relevant to medication: 12-10-19  Last refill date: 6-23-20 #/refills: 0  When pt was asked to return for OV: none  Upcoming appt/reason: none  Recent labs: 2-15-20: TSH W REFLEX

## 2020-12-02 DIAGNOSIS — E03.9 HYPOTHYROIDISM, UNSPECIFIED TYPE: ICD-10-CM

## 2020-12-02 RX ORDER — LEVOTHYROXINE SODIUM 0.1 MG/1
TABLET ORAL
Qty: 90 TABLET | Refills: 3 | OUTPATIENT
Start: 2020-12-02

## 2020-12-02 NOTE — TELEPHONE ENCOUNTER
LOV: 12/10/2019 with Ayaan Tobin NP  RTC: no follow-up on file  Last Relevant Labs: 2/15/2020 (TSH)  Filled: 9/3/2020  #90 with 0 refills    No future appointments.

## 2020-12-05 ENCOUNTER — OFFICE VISIT (OUTPATIENT)
Dept: INTERNAL MEDICINE CLINIC | Facility: CLINIC | Age: 50
End: 2020-12-05
Payer: COMMERCIAL

## 2020-12-05 VITALS
RESPIRATION RATE: 16 BRPM | HEIGHT: 63 IN | WEIGHT: 206.75 LBS | SYSTOLIC BLOOD PRESSURE: 108 MMHG | DIASTOLIC BLOOD PRESSURE: 74 MMHG | OXYGEN SATURATION: 99 % | TEMPERATURE: 98 F | BODY MASS INDEX: 36.63 KG/M2 | HEART RATE: 94 BPM

## 2020-12-05 DIAGNOSIS — Z12.11 SCREENING FOR COLORECTAL CANCER: ICD-10-CM

## 2020-12-05 DIAGNOSIS — E03.9 HYPOTHYROIDISM, UNSPECIFIED TYPE: ICD-10-CM

## 2020-12-05 DIAGNOSIS — Z12.31 ENCOUNTER FOR SCREENING MAMMOGRAM FOR MALIGNANT NEOPLASM OF BREAST: ICD-10-CM

## 2020-12-05 DIAGNOSIS — Z00.00 LABORATORY EXAMINATION ORDERED AS PART OF A ROUTINE GENERAL MEDICAL EXAMINATION: ICD-10-CM

## 2020-12-05 DIAGNOSIS — R68.82 DECREASED LIBIDO: ICD-10-CM

## 2020-12-05 DIAGNOSIS — R21 RASH: ICD-10-CM

## 2020-12-05 DIAGNOSIS — R53.83 FATIGUE, UNSPECIFIED TYPE: ICD-10-CM

## 2020-12-05 DIAGNOSIS — Z00.00 ROUTINE GENERAL MEDICAL EXAMINATION AT A HEALTH CARE FACILITY: Primary | ICD-10-CM

## 2020-12-05 DIAGNOSIS — E78.5 HYPERLIPIDEMIA, UNSPECIFIED HYPERLIPIDEMIA TYPE: ICD-10-CM

## 2020-12-05 DIAGNOSIS — Z12.12 SCREENING FOR COLORECTAL CANCER: ICD-10-CM

## 2020-12-05 DIAGNOSIS — E55.9 VITAMIN D DEFICIENCY: ICD-10-CM

## 2020-12-05 PROCEDURE — 99396 PREV VISIT EST AGE 40-64: CPT | Performed by: NURSE PRACTITIONER

## 2020-12-05 PROCEDURE — 3008F BODY MASS INDEX DOCD: CPT | Performed by: NURSE PRACTITIONER

## 2020-12-05 PROCEDURE — 3078F DIAST BP <80 MM HG: CPT | Performed by: NURSE PRACTITIONER

## 2020-12-05 PROCEDURE — 3074F SYST BP LT 130 MM HG: CPT | Performed by: NURSE PRACTITIONER

## 2020-12-05 PROCEDURE — 99214 OFFICE O/P EST MOD 30 MIN: CPT | Performed by: NURSE PRACTITIONER

## 2020-12-05 RX ORDER — ROSUVASTATIN CALCIUM 5 MG/1
5 TABLET, COATED ORAL NIGHTLY
Qty: 90 TABLET | Refills: 0 | Status: SHIPPED | OUTPATIENT
Start: 2020-12-05 | End: 2021-01-06 | Stop reason: DRUGHIGH

## 2020-12-05 RX ORDER — MULTIVIT-MIN/IRON/FOLIC ACID/K 18-600-40
2000 CAPSULE ORAL DAILY
COMMUNITY
End: 2021-06-21 | Stop reason: ALTCHOICE

## 2020-12-05 RX ORDER — LEVOTHYROXINE SODIUM 0.1 MG/1
100 TABLET ORAL DAILY
Qty: 90 TABLET | Refills: 0 | Status: SHIPPED | OUTPATIENT
Start: 2020-12-05 | End: 2020-12-05

## 2020-12-05 NOTE — PROGRESS NOTES
Patient presents with:  Physical       HPI:  Pt states she has low energy and experiencing weight gain for the past year. Also has felt more emotional for the past 3-4 weeks. She states she is not getting enough exercise.  Not sleeping well, sweating a lot bunionectomy   • DANIELE BIOPSY STEREO NODULE 1 SITE LEFT (CPT=19081)  03/2014    PAULA   • DANIELE BIOPSY STEREO NODULE 1 SITE RIGHT (CPT=19081)  3/2014   • DANIELE BIOPSY STEREO NODULE 2 SITE BILAT (CPT=19081/92126)  3/2014   • OTHER SURGICAL HISTORY      L knee surge Denies any new headaches or change in headache pattern.     EXAM:  /74   Pulse 94   Temp 98.4 °F (36.9 °C) (Oral)   Resp 16   Ht 5' 3\" (1.6 m)   Wt 206 lb 12 oz (93.8 kg)   SpO2 99%   BMI 36.62 kg/m²   GENERAL: well developed, well nourished, in no a counseling can order, check with ins on coverage    4. Hyperlipidemia, unspecified hyperlipidemia type  - Rosuvastatin Calcium (CRESTOR) 5 MG Oral Tab; Take 1 tablet (5 mg total) by mouth nightly. Dispense: 90 tablet; Refill: 0  - LIPID PANEL    5.  Hypoth

## 2020-12-19 ENCOUNTER — HOSPITAL ENCOUNTER (OUTPATIENT)
Dept: MAMMOGRAPHY | Age: 50
Discharge: HOME | End: 2020-12-19
Attending: NURSE PRACTITIONER
Payer: COMMERCIAL

## 2020-12-19 DIAGNOSIS — Z12.31 ENCOUNTER FOR SCREENING MAMMOGRAM FOR MALIGNANT NEOPLASM OF BREAST: ICD-10-CM

## 2020-12-19 PROCEDURE — 77063 BREAST TOMOSYNTHESIS BI: CPT | Performed by: NURSE PRACTITIONER

## 2020-12-19 PROCEDURE — 77067 SCR MAMMO BI INCL CAD: CPT | Performed by: NURSE PRACTITIONER

## 2021-01-01 LAB
ABSOLUTE BASOPHILS: 20 CELLS/UL (ref 0–200)
ABSOLUTE EOSINOPHILS: 61 CELLS/UL (ref 15–500)
ABSOLUTE LYMPHOCYTES: 902 CELLS/UL (ref 850–3900)
ABSOLUTE MONOCYTES: 386 CELLS/UL (ref 200–950)
ABSOLUTE NEUTROPHILS: 1531 CELLS/UL (ref 1500–7800)
ALBUMIN/GLOBULIN RATIO: 1.6 (CALC) (ref 1–2.5)
ALBUMIN: 4.2 G/DL (ref 3.6–5.1)
ALKALINE PHOSPHATASE: 118 U/L (ref 37–153)
ALT: 82 U/L (ref 6–29)
AST: 86 U/L (ref 10–35)
BASOPHILS: 0.7 %
BILIRUBIN, TOTAL: 1.9 MG/DL (ref 0.2–1.2)
BUN: 10 MG/DL (ref 7–25)
CALCIUM: 9.4 MG/DL (ref 8.6–10.4)
CARBON DIOXIDE: 26 MMOL/L (ref 20–32)
CHLORIDE: 101 MMOL/L (ref 98–110)
CHOL/HDLC RATIO: 4.1 (CALC)
CHOLESTEROL, TOTAL: 230 MG/DL
CREATININE: 0.6 MG/DL (ref 0.5–1.05)
EGFR IF AFRICN AM: 121 ML/MIN/1.73M2
EGFR IF NONAFRICN AM: 105 ML/MIN/1.73M2
EOSINOPHILS: 2.1 %
GLOBULIN: 2.7 G/DL (CALC) (ref 1.9–3.7)
GLUCOSE: 101 MG/DL (ref 65–99)
HDL CHOLESTEROL: 56 MG/DL
HEMATOCRIT: 41.2 % (ref 35–45)
HEMOGLOBIN A1C: 5 % OF TOTAL HGB
HEMOGLOBIN: 13.9 G/DL (ref 11.7–15.5)
LDL-CHOLESTEROL: 153 MG/DL (CALC)
LYMPHOCYTES: 31.1 %
MCH: 31.9 PG (ref 27–33)
MCHC: 33.7 G/DL (ref 32–36)
MCV: 94.5 FL (ref 80–100)
MONOCYTES: 13.3 %
MPV: 11.1 FL (ref 7.5–12.5)
NEUTROPHILS: 52.8 %
NON-HDL CHOLESTEROL: 174 MG/DL (CALC)
PLATELET COUNT: 95 THOUSAND/UL (ref 140–400)
POTASSIUM: 3.8 MMOL/L (ref 3.5–5.3)
PROTEIN, TOTAL: 6.9 G/DL (ref 6.1–8.1)
RDW: 12.4 % (ref 11–15)
RED BLOOD CELL COUNT: 4.36 MILLION/UL (ref 3.8–5.1)
SODIUM: 137 MMOL/L (ref 135–146)
T4, FREE: 1.2 NG/DL (ref 0.8–1.8)
TRIGLYCERIDES: 98 MG/DL
TSH W/REFLEX TO FT4: 6.94 MIU/L
VITAMIN D, 25-OH, TOTAL: 24 NG/ML (ref 30–100)
WHITE BLOOD CELL COUNT: 2.9 THOUSAND/UL (ref 3.8–10.8)

## 2021-01-04 ENCOUNTER — TELEPHONE (OUTPATIENT)
Dept: INTERNAL MEDICINE CLINIC | Facility: CLINIC | Age: 51
End: 2021-01-04

## 2021-01-06 DIAGNOSIS — E78.5 HYPERLIPIDEMIA, UNSPECIFIED HYPERLIPIDEMIA TYPE: Primary | ICD-10-CM

## 2021-01-06 DIAGNOSIS — R53.83 FATIGUE, UNSPECIFIED TYPE: ICD-10-CM

## 2021-01-06 DIAGNOSIS — E03.9 HYPOTHYROIDISM, UNSPECIFIED TYPE: ICD-10-CM

## 2021-01-06 DIAGNOSIS — E55.9 VITAMIN D DEFICIENCY: ICD-10-CM

## 2021-01-06 DIAGNOSIS — R79.89 ABNORMAL CBC: ICD-10-CM

## 2021-01-06 RX ORDER — LEVOTHYROXINE SODIUM 112 UG/1
112 TABLET ORAL
Qty: 30 TABLET | Refills: 1 | Status: SHIPPED | OUTPATIENT
Start: 2021-01-06 | End: 2021-03-04

## 2021-01-06 RX ORDER — ROSUVASTATIN CALCIUM 10 MG/1
10 TABLET, COATED ORAL NIGHTLY
Qty: 90 TABLET | Refills: 0 | Status: SHIPPED | OUTPATIENT
Start: 2021-01-06

## 2021-01-06 RX ORDER — ERGOCALCIFEROL 1.25 MG/1
50000 CAPSULE ORAL WEEKLY
Qty: 4 CAPSULE | Refills: 5 | Status: SHIPPED | OUTPATIENT
Start: 2021-01-06 | End: 2021-06-23

## 2021-03-02 NOTE — TELEPHONE ENCOUNTER
Pt. States she is down to 5 pills - she has an appointment to have her thyroid levels check on March 20th.

## 2021-03-03 NOTE — TELEPHONE ENCOUNTER
Pt called and stated she is running out of thyroid medication.  She was curious on pending approval. Please advise

## 2021-03-04 RX ORDER — LEVOTHYROXINE SODIUM 112 UG/1
112 TABLET ORAL
Qty: 30 TABLET | Refills: 0 | Status: SHIPPED | OUTPATIENT
Start: 2021-03-04 | End: 2021-03-23

## 2021-03-04 NOTE — TELEPHONE ENCOUNTER
LOV: 12/5/2020 with LITA Dee  RTC: \"1 year or sooner for any concerns\"  Last Relevant Labs: 12/31/2020   Filled: 1/6/2021  #30 with 1 refill    Future Appointments   Date Time Provider Raza Guo   4/5/2021  4:45 PM Jaron Randle MD

## 2021-03-04 NOTE — TELEPHONE ENCOUNTER
Informed patient she's over due for TSH labs. Patient stated she has appt set for [facility] on 3/20/21. Patient verbalized understanding. Task completed.

## 2021-03-04 NOTE — TELEPHONE ENCOUNTER
Pt overdue for repeat TSH which is ordered, will send 30 tabs so she does not run out but needs to complete lab

## 2021-03-21 LAB
ABSOLUTE BASOPHILS: 19 CELLS/UL (ref 0–200)
ABSOLUTE EOSINOPHILS: 61 CELLS/UL (ref 15–500)
ABSOLUTE LYMPHOCYTES: 803 CELLS/UL (ref 850–3900)
ABSOLUTE MONOCYTES: 336 CELLS/UL (ref 200–950)
ABSOLUTE NEUTROPHILS: 1981 CELLS/UL (ref 1500–7800)
BASOPHILS: 0.6 %
EOSINOPHILS: 1.9 %
HEMATOCRIT: 42.5 % (ref 35–45)
HEMOGLOBIN: 14.3 G/DL (ref 11.7–15.5)
LYMPHOCYTES: 25.1 %
MCH: 31.8 PG (ref 27–33)
MCHC: 33.6 G/DL (ref 32–36)
MCV: 94.4 FL (ref 80–100)
MONOCYTES: 10.5 %
MPV: 11.5 FL (ref 7.5–12.5)
NEUTROPHILS: 61.9 %
PLATELET COUNT: 100 THOUSAND/UL (ref 140–400)
RDW: 12.6 % (ref 11–15)
RED BLOOD CELL COUNT: 4.5 MILLION/UL (ref 3.8–5.1)
TSH: 5.13 MIU/L
WHITE BLOOD CELL COUNT: 3.2 THOUSAND/UL (ref 3.8–10.8)

## 2021-03-22 ENCOUNTER — TELEPHONE (OUTPATIENT)
Dept: INTERNAL MEDICINE CLINIC | Facility: CLINIC | Age: 51
End: 2021-03-22

## 2021-03-22 DIAGNOSIS — E03.9 HYPOTHYROIDISM, UNSPECIFIED TYPE: Primary | ICD-10-CM

## 2021-03-23 RX ORDER — LEVOTHYROXINE SODIUM 0.12 MG/1
125 TABLET ORAL
Qty: 60 TABLET | Refills: 0 | Status: SHIPPED | OUTPATIENT
Start: 2021-03-23 | End: 2021-05-21

## 2021-03-23 NOTE — PROGRESS NOTES
WBC and PLT improving but still low. I would like her to have a consult with hematology Dr. John Bonilla for thrombocytopenia due to family hx.  Thyroid also improving but still underactive, increase Levothyroxine to 125 mcg daily, send #60, repeat lab in 6 weeks

## 2021-04-02 NOTE — PROGRESS NOTES
[facility] Note    Visit Diagnosis:   Thrombocytopenia ([facility] [place] [address]  (primary encounter diagnosis)  Leukopenia, unspecified type  Neck nodule  Night sweats  Cough  Fatty liver    History of Present Illness: 50F with a PMH of Hypothyroidi breakfast., Disp: 60 tablet, Rfl: 0  Rosuvastatin Calcium 10 MG Oral Tab, Take 1 tablet (10 mg total) by mouth nightly., Disp: 90 tablet, Rfl: 0  ergocalciferol 1.25 MG (67592 UT) Oral Cap, Take 1 capsule (50,000 Units total) by mouth once a week., Disp: 4 Activity      Alcohol use: Yes        Comment: occ      Drug use: No      Sexual activity: Yes        Partners: Male        Comment: Ablation     Family History   Problem Relation Age of Onset   • High Cholesterol Mother         hypercholesterolemia   • Hy present since 12/31/20  · We discussed that etiology of her cytopenias is very broad at the moment but does include changes related to her liver and/or underlying  Bone marrow etiology or lymphoproliferative disorder.  She has no overt B symptoms aside from

## 2021-04-05 ENCOUNTER — OFFICE VISIT (OUTPATIENT)
Dept: HEMATOLOGY/ONCOLOGY | Age: 51
End: 2021-04-05
Attending: INTERNAL MEDICINE
Payer: COMMERCIAL

## 2021-04-05 VITALS
BODY MASS INDEX: 37 KG/M2 | HEART RATE: 69 BPM | RESPIRATION RATE: 18 BRPM | SYSTOLIC BLOOD PRESSURE: 115 MMHG | WEIGHT: 211.38 LBS | DIASTOLIC BLOOD PRESSURE: 77 MMHG | OXYGEN SATURATION: 92 % | TEMPERATURE: 99 F

## 2021-04-05 DIAGNOSIS — R61 NIGHT SWEATS: ICD-10-CM

## 2021-04-05 DIAGNOSIS — D72.819 LEUKOPENIA, UNSPECIFIED TYPE: ICD-10-CM

## 2021-04-05 DIAGNOSIS — D69.6 THROMBOCYTOPENIA (HCC): Primary | ICD-10-CM

## 2021-04-05 DIAGNOSIS — K76.0 FATTY LIVER: ICD-10-CM

## 2021-04-05 DIAGNOSIS — R22.1 NECK NODULE: ICD-10-CM

## 2021-04-05 DIAGNOSIS — R05.9 COUGH: ICD-10-CM

## 2021-04-05 PROCEDURE — 99245 OFF/OP CONSLTJ NEW/EST HI 55: CPT | Performed by: INTERNAL MEDICINE

## 2021-04-05 NOTE — PROGRESS NOTES
Education Record    Learner:  Patient    Disease / Diagnosis:    Barriers / Limitations:  None   Comments:    Method:  Discussion   Comments:    General Topics:  Plan of care reviewed   Comments:    Outcome:  Shows understanding   Comments:    Patient here

## 2021-04-26 ENCOUNTER — TELEPHONE (OUTPATIENT)
Dept: HEMATOLOGY/ONCOLOGY | Facility: HOSPITAL | Age: 51
End: 2021-04-26

## 2021-05-10 ENCOUNTER — TELEPHONE (OUTPATIENT)
Dept: HEMATOLOGY/ONCOLOGY | Facility: HOSPITAL | Age: 51
End: 2021-05-10

## 2021-05-10 NOTE — TELEPHONE ENCOUNTER
Spoke with patient via [address] Will follow-up with patient once results have been reviewed.

## 2021-05-10 NOTE — TELEPHONE ENCOUNTER
Patient received notice that she got test results but cannot see them. She took the tests on Saturday;  she has sent a My Chart message to the nurse. She is not seeing the results.

## 2021-05-18 ENCOUNTER — TELEPHONE (OUTPATIENT)
Dept: HEMATOLOGY/ONCOLOGY | Facility: HOSPITAL | Age: 51
End: 2021-05-18

## 2021-05-18 DIAGNOSIS — K76.0 FATTY LIVER: ICD-10-CM

## 2021-05-18 DIAGNOSIS — D69.6 THROMBOCYTOPENIA (HCC): Primary | ICD-10-CM

## 2021-05-18 DIAGNOSIS — D72.819 LEUKOPENIA, UNSPECIFIED TYPE: ICD-10-CM

## 2021-05-18 PROCEDURE — 88175 CYTOPATH C/V AUTO FLUID REDO: CPT | Performed by: NURSE PRACTITIONER

## 2021-05-18 PROCEDURE — 87624 HPV HI-RISK TYP POOLED RSLT: CPT | Performed by: NURSE PRACTITIONER

## 2021-05-18 RX ORDER — FOLIC ACID 1 MG/1
1 TABLET ORAL DAILY
Qty: 30 TABLET | Refills: 0 | Status: SHIPPED | OUTPATIENT
Start: 2021-05-18 | End: 2021-06-14

## 2021-05-18 NOTE — TELEPHONE ENCOUNTER
Ashli saenz has questions regarding her lab order. Is it going to be faxed to [employer] [employer] or sent to patient.  mable

## 2021-05-18 NOTE — TELEPHONE ENCOUNTER
Spoke with patient with MD permission regarding recent lab and imaging results. All questions and concerns addressed. Follow-up appointment already established.

## 2021-05-19 ENCOUNTER — TELEPHONE (OUTPATIENT)
Dept: INTERNAL MEDICINE CLINIC | Facility: CLINIC | Age: 51
End: 2021-05-19

## 2021-05-19 NOTE — TELEPHONE ENCOUNTER
Pt notified that thyroid levels were [facility] on 3/23/21 and she can contact [facility] for appointment.

## 2021-05-19 NOTE — TELEPHONE ENCOUNTER
Due for repeat thyroid test [facility]. KR result note 3/20/21:    \"WBC and PLT improving but still low. I would like her to have a consult with hematology Dr. Patsy Arita for thrombocytopenia due to family hx.  Thyroid also improving but still underactive, incre

## 2021-05-21 RX ORDER — LEVOTHYROXINE SODIUM 0.12 MG/1
125 TABLET ORAL
Qty: 30 TABLET | Refills: 1 | Status: SHIPPED | OUTPATIENT
Start: 2021-05-21 | End: 2021-06-09

## 2021-05-21 NOTE — TELEPHONE ENCOUNTER
Medication(s) to Refill:   Requested Prescriptions     Pending Prescriptions Disp Refills   • LEVOTHYROXINE SODIUM 125 MCG Oral Tab [Pharmacy Med Name: LEVOTHYROXINE 125 MCG TABLET] 30 tablet 1     Sig: TAKE 1 TABLET (125 MCG TOTAL) BY MOUTH BEFORE BREAKFA Family/friends will be notified when your loved one is ready for pick-up. Thank you for your understanding.

## 2021-05-24 ENCOUNTER — APPOINTMENT (OUTPATIENT)
Dept: HEMATOLOGY/ONCOLOGY | Age: 51
End: 2021-05-24
Attending: INTERNAL MEDICINE
Payer: COMMERCIAL

## 2021-06-05 NOTE — PROGRESS NOTES
THE [facility]ematology and Oncology Clinic Note    Visit Diagnosis:   Thrombocytopenia ([facility] [address]  (primary encounter diagnosis)  Leukopenia, unspecified type    History of Present Illness: 50F with a PMH of Hypothyroidism, Basal Cell carcinoma s/p resection x 4, HLD negative hemochromatosis panel, Cr 0.4, T bili 2.6, AST 84, ALT 55, INR 1.3, PT 12.9, WBC 3.6, Plt 79, HB 13.8, ferritin 241, Fe sat 55. She has follow up in July with Dr. Chrisitne Jones. She has occasional excessive bleeding with cuts.  She states that she had Rosacea    • Unspecified hypothyroidism 2012     Past Surgical History:   Procedure Laterality Date   • BREAST BIOPSY     •      • COLPOSCOPY, CERVIX W/UPPER ADJACENT VAGINA; W/BIOPSY(S), CERVIX  ,     HPV, nl   • CRYOCAUTERY OF C (36.9 °C) (06/07 1528)  Do Not Use - Resp Rate: --  SpO2: 99 % (06/07 1528)     General: NAD, AOX3  HEENT: clear op, mmm, no jvd, no scleral icterus  CV: RRR S1S2 no murmurs  Extremities: mild pedal edema  Lungs: no increased work of breathing   Neuro: CN:

## 2021-06-07 ENCOUNTER — OFFICE VISIT (OUTPATIENT)
Dept: HEMATOLOGY/ONCOLOGY | Age: 51
End: 2021-06-07
Attending: INTERNAL MEDICINE
Payer: COMMERCIAL

## 2021-06-07 VITALS
TEMPERATURE: 98 F | DIASTOLIC BLOOD PRESSURE: 79 MMHG | SYSTOLIC BLOOD PRESSURE: 131 MMHG | HEART RATE: 97 BPM | RESPIRATION RATE: 18 BRPM | WEIGHT: 209.5 LBS | BODY MASS INDEX: 37 KG/M2 | OXYGEN SATURATION: 99 %

## 2021-06-07 DIAGNOSIS — K74.60 CIRRHOSIS OF LIVER WITHOUT ASCITES, UNSPECIFIED HEPATIC CIRRHOSIS TYPE (HCC): ICD-10-CM

## 2021-06-07 DIAGNOSIS — D72.819 LEUKOPENIA, UNSPECIFIED TYPE: ICD-10-CM

## 2021-06-07 DIAGNOSIS — D69.6 THROMBOCYTOPENIA (HCC): Primary | ICD-10-CM

## 2021-06-07 PROBLEM — Z98.891 HISTORY OF C-SECTION: Status: ACTIVE | Noted: 2021-06-07

## 2021-06-07 PROBLEM — Z98.890 HISTORY OF CRYOSURGERY: Status: ACTIVE | Noted: 2021-06-07

## 2021-06-07 PROBLEM — Z98.890 HISTORY OF ENDOMETRIAL ABLATION: Status: ACTIVE | Noted: 2021-06-07

## 2021-06-07 PROCEDURE — 99214 OFFICE O/P EST MOD 30 MIN: CPT | Performed by: INTERNAL MEDICINE

## 2021-06-07 PROCEDURE — 88305 TISSUE EXAM BY PATHOLOGIST: CPT | Performed by: OBSTETRICS & GYNECOLOGY

## 2021-06-09 ENCOUNTER — TELEPHONE (OUTPATIENT)
Dept: INTERNAL MEDICINE CLINIC | Facility: CLINIC | Age: 51
End: 2021-06-09

## 2021-06-09 DIAGNOSIS — E03.9 HYPOTHYROIDISM, UNSPECIFIED TYPE: Primary | ICD-10-CM

## 2021-06-09 RX ORDER — LEVOTHYROXINE SODIUM 0.12 MG/1
125 TABLET ORAL
Qty: 90 TABLET | Refills: 0 | Status: SHIPPED | OUTPATIENT
Start: 2021-06-09 | End: 2021-09-21

## 2021-06-09 NOTE — TELEPHONE ENCOUNTER
YURI Childress   6/9/2021  3:52 PM CDT Back to Top      TSH in normal range however borderline overactive. Please repeat TSH in 3 months but continue current dose of levothyroxine.       Repeat lab ordered and pt is requesting a 90 day refill on t

## 2021-06-12 DIAGNOSIS — D69.6 THROMBOCYTOPENIA (HCC): ICD-10-CM

## 2021-06-12 DIAGNOSIS — D72.819 LEUKOPENIA, UNSPECIFIED TYPE: ICD-10-CM

## 2021-06-14 RX ORDER — FOLIC ACID 1 MG/1
TABLET ORAL
Qty: 30 TABLET | Refills: 0 | Status: SHIPPED | OUTPATIENT
Start: 2021-06-14 | End: 2021-06-16

## 2021-06-16 DIAGNOSIS — D69.6 THROMBOCYTOPENIA (HCC): ICD-10-CM

## 2021-06-16 DIAGNOSIS — D72.819 LEUKOPENIA, UNSPECIFIED TYPE: ICD-10-CM

## 2021-06-16 RX ORDER — FOLIC ACID 1 MG/1
1 TABLET ORAL DAILY
Qty: 30 TABLET | Refills: 3 | Status: SHIPPED | OUTPATIENT
Start: 2021-06-16 | End: 2021-07-15

## 2021-06-21 NOTE — PROGRESS NOTES
[facility] at [facility]  [address] [facility] [address], [address]  [address]  [phone] ([phone]) PAULA   • DANIELE BIOPSY STEREO NODULE 1 SITE RIGHT (CPT=19081)  3/2014   • DANIELE BIOPSY STEREO NODULE 2 SITE BILAT (CPT=19081/59244)  3/2014   • OTHER SURGICAL HISTORY      L knee surgery   • TONSILLECTOMY        Family History   Problem Relation Age of Onset   • alcohol +/- some LOPEZ cirrhosis complicated by modest volume overload    - Regarding cirrhosis, likely alcohol the major contributor but does have some history of being overweight in the past with some significant weight fluctuations.  She stopped drinking

## 2021-06-22 DIAGNOSIS — E55.9 VITAMIN D DEFICIENCY: ICD-10-CM

## 2021-06-22 RX ORDER — ERGOCALCIFEROL 1.25 MG/1
CAPSULE ORAL
Qty: 4 CAPSULE | Refills: 5 | OUTPATIENT
Start: 2021-06-22

## 2021-06-25 DIAGNOSIS — K70.30 ALCOHOLIC CIRRHOSIS OF LIVER WITHOUT ASCITES (HCC): ICD-10-CM

## 2021-06-25 RX ORDER — FUROSEMIDE 40 MG/1
40 TABLET ORAL 2 TIMES DAILY
Qty: 60 TABLET | Refills: 1 | Status: SHIPPED | OUTPATIENT
Start: 2021-06-25 | End: 2021-07-08

## 2021-06-25 NOTE — TELEPHONE ENCOUNTER
Returned call regarding continued leg edema. Patient is urinating a lot in AM.   Instructed to Increase Lasix dose to 40 mg BID; repeat labs in 3 weeks. Continue on potassium supplement 40 mg daily.     YURI Nicole  Nurse Practitioner, Hepatology  3

## 2021-07-08 NOTE — TELEPHONE ENCOUNTER
Returned patients call concerning increase swelling despite diuretics. Will increase Lasix to 60 mg BID and K+ to 60 MEQ. Instructed patient to avoid sodium and to get labs in 2 weeks.     YURI Kidd  Nurse Practitioner, Hepatology  [phone] (of

## 2021-07-15 DIAGNOSIS — D72.819 LEUKOPENIA, UNSPECIFIED TYPE: ICD-10-CM

## 2021-07-15 DIAGNOSIS — D69.6 THROMBOCYTOPENIA (HCC): ICD-10-CM

## 2021-07-15 RX ORDER — FOLIC ACID 1 MG/1
TABLET ORAL
Qty: 30 TABLET | Refills: 3 | Status: SHIPPED | OUTPATIENT
Start: 2021-07-15 | End: 2022-02-15 | Stop reason: ALTCHOICE

## 2021-07-25 LAB — VITAMIN D, 25-OH, TOTAL: 53 NG/ML (ref 30–100)

## 2021-08-05 NOTE — PROGRESS NOTES
[facility] at [facility]  [address] [facility], [address]  [address]  [phone] ([phone]) unspecified 2012   • Rosacea 2012   • Rosacea    • Unspecified hypothyroidism 2012      Past Surgical History:   Procedure Laterality Date   • BREAST BIOPSY     •      • COLPOSCOPY, CERVIX W/UPPER ADJACENT VAGINA; W/BIOPSY(S), C MCG Oral Tab, Take 1 tablet (125 mcg total) by mouth before breakfast., Disp: 90 tablet, Rfl: 0  •  Minocycline HCl 100 MG Oral Cap, , Disp: , Rfl:   •  Sulfacetamide Sodium-Sulfur 10-5 % External Emulsion, , Disp: , Rfl:   •  Rosuvastatin Calcium 10 MG Or

## 2021-08-09 ENCOUNTER — TELEPHONE (OUTPATIENT)
Dept: INTERNAL MEDICINE CLINIC | Facility: CLINIC | Age: 51
End: 2021-08-09

## 2021-08-09 NOTE — TELEPHONE ENCOUNTER
NV scheduled for HAV/HBV vaccine. Recommendation from Dr. Geoff Castro.   Orders needed    Future Appointments   Date Time Provider Raza Guo   8/16/2021  4:30 PM [facility] 08 NURSE [facility] 8 [facility] [place]

## 2021-08-16 ENCOUNTER — NURSE ONLY (OUTPATIENT)
Dept: INTERNAL MEDICINE CLINIC | Facility: CLINIC | Age: 51
End: 2021-08-16
Payer: COMMERCIAL

## 2021-08-16 PROCEDURE — 90636 HEP A/HEP B VACC ADULT IM: CPT | Performed by: FAMILY MEDICINE

## 2021-08-16 PROCEDURE — 90471 IMMUNIZATION ADMIN: CPT | Performed by: FAMILY MEDICINE

## 2021-08-16 NOTE — PROGRESS NOTES
Patient received Twinrix. Tolerated well. Vaccine information sheet given to patient. D/t allergy to neomycin-Bacitracin-Polymyxin, will have patient wait 10-15 minutes after administration to ensure no reaction.  Patient aware and verbalized understand

## 2021-08-23 DIAGNOSIS — K70.30 ALCOHOLIC CIRRHOSIS OF LIVER WITHOUT ASCITES (HCC): ICD-10-CM

## 2021-08-23 RX ORDER — FUROSEMIDE 40 MG/1
60 TABLET ORAL 2 TIMES DAILY
Qty: 90 TABLET | Refills: 11 | Status: SHIPPED | OUTPATIENT
Start: 2021-08-23

## 2021-08-27 RX ORDER — MULTIVIT-MIN/IRON/FOLIC ACID/K 18-600-40
CAPSULE ORAL
COMMUNITY

## 2021-08-30 ENCOUNTER — LAB ENCOUNTER (OUTPATIENT)
Dept: LAB | Age: 51
End: 2021-08-30
Attending: INTERNAL MEDICINE
Payer: COMMERCIAL

## 2021-08-30 DIAGNOSIS — Z01.818 PRE-OP TESTING: ICD-10-CM

## 2021-08-31 LAB — SARS-COV-2 RNA RESP QL NAA+PROBE: NOT DETECTED

## 2021-08-31 NOTE — PROGRESS NOTES
Karla Sood,    The testing for COVID-19 is negative. I will see you for your procedure. Please let me know if you have questions.     Thanks,   Dr. Caprice Webber

## 2021-09-02 ENCOUNTER — ANESTHESIA EVENT (OUTPATIENT)
Dept: ENDOSCOPY | Facility: HOSPITAL | Age: 51
End: 2021-09-02
Payer: COMMERCIAL

## 2021-09-02 ENCOUNTER — ANESTHESIA (OUTPATIENT)
Dept: ENDOSCOPY | Facility: HOSPITAL | Age: 51
End: 2021-09-02
Payer: COMMERCIAL

## 2021-09-02 ENCOUNTER — HOSPITAL ENCOUNTER (OUTPATIENT)
Facility: HOSPITAL | Age: 51
Setting detail: HOSPITAL OUTPATIENT SURGERY
Discharge: HOME | End: 2021-09-02
Attending: INTERNAL MEDICINE | Admitting: INTERNAL MEDICINE
Payer: COMMERCIAL

## 2021-09-02 VITALS
OXYGEN SATURATION: 96 % | RESPIRATION RATE: 14 BRPM | WEIGHT: 190 LBS | HEART RATE: 82 BPM | SYSTOLIC BLOOD PRESSURE: 98 MMHG | HEIGHT: 62 IN | BODY MASS INDEX: 34.96 KG/M2 | DIASTOLIC BLOOD PRESSURE: 54 MMHG

## 2021-09-02 DIAGNOSIS — Z01.818 PRE-OP TESTING: Primary | ICD-10-CM

## 2021-09-02 DIAGNOSIS — K70.30 ALCOHOLIC CIRRHOSIS, UNSPECIFIED WHETHER ASCITES PRESENT (HCC): ICD-10-CM

## 2021-09-02 PROCEDURE — 88305 TISSUE EXAM BY PATHOLOGIST: CPT | Performed by: INTERNAL MEDICINE

## 2021-09-02 PROCEDURE — 0DB68ZX EXCISION OF STOMACH, VIA NATURAL OR ARTIFICIAL OPENING ENDOSCOPIC, DIAGNOSTIC: ICD-10-PCS | Performed by: INTERNAL MEDICINE

## 2021-09-02 PROCEDURE — 88312 SPECIAL STAINS GROUP 1: CPT | Performed by: INTERNAL MEDICINE

## 2021-09-02 PROCEDURE — 36415 COLL VENOUS BLD VENIPUNCTURE: CPT | Performed by: INTERNAL MEDICINE

## 2021-09-02 RX ORDER — SODIUM CHLORIDE, SODIUM LACTATE, POTASSIUM CHLORIDE, CALCIUM CHLORIDE 600; 310; 30; 20 MG/100ML; MG/100ML; MG/100ML; MG/100ML
INJECTION, SOLUTION INTRAVENOUS CONTINUOUS
Status: DISCONTINUED | OUTPATIENT
Start: 2021-09-02 | End: 2021-09-02

## 2021-09-02 RX ORDER — LIDOCAINE HYDROCHLORIDE 10 MG/ML
INJECTION, SOLUTION EPIDURAL; INFILTRATION; INTRACAUDAL; PERINEURAL AS NEEDED
Status: DISCONTINUED | OUTPATIENT
Start: 2021-09-02 | End: 2021-09-02 | Stop reason: SURG

## 2021-09-02 RX ADMIN — LIDOCAINE HYDROCHLORIDE 50 MG: 10 INJECTION, SOLUTION EPIDURAL; INFILTRATION; INTRACAUDAL; PERINEURAL at 13:48:00

## 2021-09-02 RX ADMIN — SODIUM CHLORIDE, SODIUM LACTATE, POTASSIUM CHLORIDE, CALCIUM CHLORIDE: 600; 310; 30; 20 INJECTION, SOLUTION INTRAVENOUS at 13:46:00

## 2021-09-02 NOTE — ANESTHESIA PREPROCEDURE EVALUATION
Anesthesia PreOp Note    HPI:     Maicol Brizuela is a 46year old female who presents for preoperative consultation requested by: Tiffany Garcia MD    Date of Surgery: 9/2/2021    Procedure(s):  ESOPHAGOGASTRODUODENOSCOPY (EGD)  Indication: Alcoholic cir Date   • HIGH BLOOD PRESSURE    • HIGH CHOLESTEROL    • History of mumps    • History of varicella    • Human papillomavirus in conditions classified elsewhere and of unspecified site     when 20   • In vitro fertilization     d/t infertility   • Myalgia a No current Epic-ordered facility-administered medications on file. No current [facility]-ordered outpatient medications on file.         Neosporin [Neomycin*    SWELLING    Comment:Ointment  Augmentin [Amoxicil*    RASH    Family History   Problem Relation Determinants of Health  Financial Resource Strain:       Difficulty of Paying Living Expenses:   Food Insecurity:       Worried About [address] in the Last Year:       Ran Out of Food in the Last Year:   Transportation Needs:       Lack of Transp desires the anesthetic management as planned.   Nataliia Olguin, MAUREEN  9/2/2021 12:50 PM

## 2021-09-02 NOTE — H&P
[facility] AND CLINICS  Pre-procedure History and Physical      Nanda Cole Patient Status:  Hospital Outpatient Surgery    1970 MRN [id number]   Location [facility] ENDOSCOPY LAB SUITES Attending Lexie Samuels MD   Hosp Day # 0 SUZE Evans Res complications. The limitations of the procedure were reviewed. The patient agrees and all questions were answered. Rosario Morales M.D.   [facility]  Section of Gastroenterology

## 2021-09-02 NOTE — ANESTHESIA POSTPROCEDURE EVALUATION
Patient: Gia Mari    Procedure Summary     Date: 09/02/21 Room / Location: [facility] ENDOSCOPY 05 / [facility] ENDOSCOPY    Anesthesia Start: 3182 Anesthesia Stop: 6157    Procedure: ESOPHAGOGASTRODUODENOSCOPY (EGD) (N/A ) Diagnosis:       Alcoholic cirrhosis, u

## 2021-09-02 NOTE — OPERATIVE REPORT
[facility] AND [facility]  Esophagogastroduodenoscopy Report    Sandi Ibarra Patient Status:  Hospital Outpatient Surgery    1970 MRN [id number]   Location [facility] ENDOSCOPY LAB SUITES Attending Janice Worthington MD      DATE OF OPERATION:

## 2021-09-07 NOTE — PROGRESS NOTES
Yasmin Chappell had a EGD on 9/2/21 for a history of cirrhosis. The EGD reveals fundic gland polyps in the stomach. She does not have varices. I would like her to continue to follow with Dr. Kelly Coats. Results sent to the patient.

## 2021-09-13 ENCOUNTER — NURSE ONLY (OUTPATIENT)
Dept: INTERNAL MEDICINE CLINIC | Facility: CLINIC | Age: 51
End: 2021-09-13
Payer: COMMERCIAL

## 2021-09-13 PROCEDURE — 90471 IMMUNIZATION ADMIN: CPT | Performed by: NURSE PRACTITIONER

## 2021-09-13 PROCEDURE — 90746 HEPB VACCINE 3 DOSE ADULT IM: CPT | Performed by: NURSE PRACTITIONER

## 2021-09-15 NOTE — PROGRESS NOTES
[facility]  [address], [address]  [address]  [facility]  [phone] ([phone]) HIGH CHOLESTEROL    • History of mumps    • History of varicella    • Human papillomavirus in conditions classified elsewhere and of unspecified site     when 20   • In vitro fertilization     d/t infertility   • Myalgia and myositis, unspecified 6/29/2012 mouth., Disp: , Rfl:   •  furosemide 40 MG Oral Tab, Take 1.5 tablets (60 mg total) by mouth 2 (two) times daily. , Disp: 90 tablet, Rfl: 11  •  spironolactone 50 MG Oral Tab, Take 1 tablet (50 mg total) by mouth daily. , Disp: 30 tablet, Rfl: 5  •  FOLIC AC months late Nov / early Dec (after imaging and labs)      Patient discussed with and/or seen by Dr. Christine Jones. YURI Joshi  Nurse Practitioner  [facility] of [address]  [address]  [facility], 7th floor, [address] [facility]

## 2021-09-15 NOTE — PROGRESS NOTES
[facility]  [address] [facility], [address]  [address]  [place], [address]  [phone] ([phone]) Rosacea    • Unspecified hypothyroidism 2012      Past Surgical History:   Procedure Laterality Date   • BREAST BIOPSY     •      • COLPOSCOPY, CERVIX W/UPPER ADJACENT VAGINA; W/BIOPSY(S), CERVIX  ,     HPV, nl   • CRYOCAUTERY OF TABLET BY MOUTH EVERY DAY, Disp: 30 tablet, Rfl: 3  •  Potassium Chloride ER 20 MEQ Oral Tab CR, Take 60 mEq by mouth daily. , Disp: 90 tablet, Rfl: 1  •  metRONIDAZOLE 0.75 % External Cream, , Disp: , Rfl:   •  Levothyroxine Sodium 125 MCG Oral Tab, Take 1 Hepatology  Medical Director of Liver Transplant  [facility] [address]  [place] [address], 7th floor, [place], [place],  [facility] (office)  [phone] (fax)  [phone] (mobile)  [email or web address]

## 2021-09-21 RX ORDER — LEVOTHYROXINE SODIUM 0.12 MG/1
125 TABLET ORAL
Qty: 30 TABLET | Refills: 1 | Status: SHIPPED | OUTPATIENT
Start: 2021-09-21 | End: 2021-10-31

## 2021-09-21 NOTE — TELEPHONE ENCOUNTER
Name from pharmacy: LEVOTHYROXINE [address]         Will file in chart as: LEVOTHYROXINE 125 MCG Oral Tab    Sig: Take 1 tablet (125 mcg total) by mouth before breakfast.    Disp:  30 tablet    Refills:  1    Start: 9/18/2021    Class: Normal    Non-

## 2021-10-27 NOTE — TELEPHONE ENCOUNTER
LOV: 12/5/2020 with YURI Cox  RTC: 1 year  Last Relevant Labs: 7/24/2021 (Vitamin D)  Filled: 9/21/2021    #30 with 1 refill    Future Appointments   Date Time Provider Raza Guo   11/1/2021  4:00 PM Juanita Levy MD [facility] EMG Surg/Onc

## 2021-10-29 NOTE — PROGRESS NOTES
[facility]  [address] [facility], [address]  [address]  [address]  [phone] ([phone]) myositis, unspecified 2012   • Rosacea 2012   • Rosacea    • Unspecified hypothyroidism 2012      Past Surgical History:   Procedure Laterality Date   • BREAST BIOPSY     •      • COLPOSCOPY, CERVIX W/UPPER ADJACENT VAGINA; W/BI daily., Disp: 90 tablet, Rfl: 11  •  spironolactone 50 MG Oral Tab, Take 1 tablet (50 mg total) by mouth daily. , Disp: 30 tablet, Rfl: 5  •  FOLIC ACID 1 MG Oral Tab, TAKE 1 TABLET BY MOUTH EVERY DAY, Disp: 30 tablet, Rfl: 3  •  Potassium Chloride ER 20 ME B-complex vitamin given lower WBC  - Swelling has improved, on Lasix 60 mg BID (+ potassium 40 meQ daily) + aldactone 50 mg daily; she  skips PM lasix at times and can continue to reduce at her discretion; recommend wearing compression stockings while nimisha

## 2021-10-31 RX ORDER — LEVOTHYROXINE SODIUM 0.12 MG/1
125 TABLET ORAL
Qty: 30 TABLET | Refills: 0 | Status: SHIPPED | OUTPATIENT
Start: 2021-10-31 | End: 2021-12-29

## 2021-11-01 ENCOUNTER — OFFICE VISIT (OUTPATIENT)
Dept: SURGERY | Facility: CLINIC | Age: 51
End: 2021-11-01
Payer: COMMERCIAL

## 2021-11-01 VITALS
BODY MASS INDEX: 35 KG/M2 | RESPIRATION RATE: 16 BRPM | WEIGHT: 189 LBS | SYSTOLIC BLOOD PRESSURE: 116 MMHG | HEART RATE: 84 BPM | OXYGEN SATURATION: 100 % | DIASTOLIC BLOOD PRESSURE: 75 MMHG

## 2021-11-01 DIAGNOSIS — K74.60 CIRRHOSIS OF LIVER WITHOUT ASCITES, UNSPECIFIED HEPATIC CIRRHOSIS TYPE (HCC): Primary | ICD-10-CM

## 2021-11-29 ENCOUNTER — TELEPHONE (OUTPATIENT)
Dept: SURGERY | Facility: CLINIC | Age: 51
End: 2021-11-29

## 2021-11-29 NOTE — TELEPHONE ENCOUNTER
Returned patient's call regarding ankle swelling. I suspect possibly due to added NA from Thanksgiving meal (although she thinks not - but mentions eating out) and traveling.   Suggested she keep feet elevated as much as possible, wear compression sock, and

## 2021-12-09 ENCOUNTER — TELEPHONE (OUTPATIENT)
Dept: INTERNAL MEDICINE CLINIC | Facility: CLINIC | Age: 51
End: 2021-12-09

## 2021-12-09 DIAGNOSIS — Z12.31 ENCOUNTER FOR SCREENING MAMMOGRAM FOR MALIGNANT NEOPLASM OF BREAST: Primary | ICD-10-CM

## 2021-12-29 ENCOUNTER — PATIENT MESSAGE (OUTPATIENT)
Dept: INTERNAL MEDICINE CLINIC | Facility: CLINIC | Age: 51
End: 2021-12-29

## 2021-12-29 DIAGNOSIS — Z00.00 LABORATORY EXAMINATION ORDERED AS PART OF A ROUTINE GENERAL MEDICAL EXAMINATION: Primary | ICD-10-CM

## 2021-12-29 DIAGNOSIS — E55.9 VITAMIN D DEFICIENCY: ICD-10-CM

## 2021-12-29 RX ORDER — LEVOTHYROXINE SODIUM 0.12 MG/1
125 TABLET ORAL
Qty: 30 TABLET | Refills: 0 | Status: SHIPPED | OUTPATIENT
Start: 2021-12-29 | End: 2022-01-26

## 2021-12-29 NOTE — TELEPHONE ENCOUNTER
Pt is due for px and lab work    Levothyroxine 125 mcg failed protocol due to  Thyroid Supplements Protocol Failed 12/29/2021 12:15 AM   Protocol Details  Appointment in past 12 or next 3 months   Filled 10-31-21  Qty 30   0 refills  No upcoming appt.   LOV

## 2022-01-13 NOTE — TELEPHONE ENCOUNTER
Please advise    Patient has not been seen since December of 2020 but is not willing to come in for a CPX but is agreeing to have labs done at [address]       Please advise     Pts message:    Pt called stating that she doesn't understand how she is supposed t

## 2022-01-20 ENCOUNTER — TELEPHONE (OUTPATIENT)
Dept: INTERNAL MEDICINE CLINIC | Facility: CLINIC | Age: 52
End: 2022-01-20

## 2022-01-20 DIAGNOSIS — Z23 NEED FOR HEPATITIS B VACCINATION: Primary | ICD-10-CM

## 2022-01-20 NOTE — TELEPHONE ENCOUNTER
Pt scheduled appointment. Requesting for lab orders to be placed to [facility] and requesting orders to be mailed to her.      Please advise    Future Appointments   Date Time Provider Raza Guo   2/15/2022  5:00 PM YURI Carlin EMG 8 EMG [place]

## 2022-01-20 NOTE — TELEPHONE ENCOUNTER
Pt wondering when she is due for her 3rd Hep B vaccine, she believes she is due soon.      Please advise

## 2022-01-20 NOTE — TELEPHONE ENCOUNTER
1st Hep B vaccine given on 8/16/2021  2nd Hep B vaccine given on 09/13/2021  3rd hep B vaccine pended for your review and approval if appropriate. Please advise.

## 2022-01-24 NOTE — TELEPHONE ENCOUNTER
Patients lab orders printed and placed in [facility] mail per patients request to have them mailed to her

## 2022-01-26 RX ORDER — LEVOTHYROXINE SODIUM 0.12 MG/1
125 TABLET ORAL
Qty: 30 TABLET | Refills: 0 | Status: SHIPPED | OUTPATIENT
Start: 2022-01-26

## 2022-01-26 NOTE — TELEPHONE ENCOUNTER
Levothyroxine 125 mcg   Filled 12-29-21  Qty 30  0 refills  Upcoming appt.  2-15-22 Moira WESTFALL 12-5-20

## 2022-02-01 ENCOUNTER — TELEPHONE (OUTPATIENT)
Dept: SURGERY | Facility: CLINIC | Age: 52
End: 2022-02-01

## 2022-02-01 ENCOUNTER — TELEPHONE (OUTPATIENT)
Dept: INTERNAL MEDICINE CLINIC | Facility: CLINIC | Age: 52
End: 2022-02-01

## 2022-02-01 NOTE — TELEPHONE ENCOUNTER
Patient stated she completed CBC and CMP on 1/29/22 ordered by Dr. Oswaldo Reyna. Pt wanted to inform Abby Tillman of this so that she would not repeat duplicate CBC and CMP lab ordered by Abby Tlilman 1/24. I informed patient that we are not able to see the results, patient stated she will have Dr. Lulú Pena office upload results in chart so that Abby Tillman is able to view them. I also informed patient that she still has additional labs ordered by Abby Tillman to be completed. Patient verbalized understanding at this time.

## 2022-02-01 NOTE — TELEPHONE ENCOUNTER
Pt returning call, pt is on phone throughout the day, pt states it is ok to leave a detailed voicemail if she can't answer.

## 2022-02-01 NOTE — TELEPHONE ENCOUNTER
Called kaleb left detailed VM message regarding [facility] lab results. Labs over stable- perhaps a touch improved. Reminded of upcoming appt.     YURI Thompson  Nurse Practitioner, Hepatology  [phone] (office)

## 2022-02-01 NOTE — TELEPHONE ENCOUNTER
Pt called and stated Wyatt Snyder has ordered lab work, pt stated she sees doctor Lety Giordano and stated the same [place] has ordered, she has completed a week ago by dr. Nidhi Foster. Pt would like to know if she still needs the lab work done or if Jessika can use the results from dr. Nidhi Foster. Pt stated she would lie to avoid paying again to repeat the same labs. Please advise.

## 2022-02-07 RX ORDER — SPIRONOLACTONE 50 MG/1
TABLET, FILM COATED ORAL
Qty: 30 TABLET | Refills: 5 | Status: SHIPPED | OUTPATIENT
Start: 2022-02-07

## 2022-02-13 LAB
HEMOGLOBIN A1C: 4.9 % OF TOTAL HGB
T4, FREE: 1.6 NG/DL (ref 0.8–1.8)
TSH W/REFLEX TO FT4: 0.03 MIU/L
VITAMIN D, 25-OH, TOTAL: 61 NG/ML (ref 30–100)

## 2022-02-14 ENCOUNTER — TELEPHONE (OUTPATIENT)
Dept: INTERNAL MEDICINE CLINIC | Facility: CLINIC | Age: 52
End: 2022-02-14

## 2022-02-14 RX ORDER — LEVOTHYROXINE SODIUM 112 UG/1
112 TABLET ORAL
Qty: 30 TABLET | Refills: 1 | Status: SHIPPED | OUTPATIENT
Start: 2022-02-14

## 2022-02-14 NOTE — TELEPHONE ENCOUNTER
----- Message from YURI Felix sent at 2/14/2022  8:22 AM CST -----  TSH too low, decrease Levothyroxine to 112 mcg daily. Recheck TSH in 6-8 weeks. Vit D and hgba1c wnl. Why did Pt not get labs done from 1/24/22? Pt was to get them all done. Please have her return to the lab to get them all done.

## 2022-02-15 ENCOUNTER — OFFICE VISIT (OUTPATIENT)
Dept: INTERNAL MEDICINE CLINIC | Facility: CLINIC | Age: 52
End: 2022-02-15
Payer: COMMERCIAL

## 2022-02-15 VITALS
DIASTOLIC BLOOD PRESSURE: 74 MMHG | RESPIRATION RATE: 16 BRPM | HEIGHT: 62 IN | TEMPERATURE: 98 F | HEART RATE: 82 BPM | WEIGHT: 187 LBS | SYSTOLIC BLOOD PRESSURE: 116 MMHG | BODY MASS INDEX: 34.41 KG/M2

## 2022-02-15 DIAGNOSIS — Z23 NEED FOR HEPATITIS A AND B VACCINATION: ICD-10-CM

## 2022-02-15 DIAGNOSIS — Z00.00 ROUTINE GENERAL MEDICAL EXAMINATION AT A HEALTH CARE FACILITY: Primary | ICD-10-CM

## 2022-02-15 PROBLEM — K74.60 CIRRHOSIS OF LIVER WITHOUT ASCITES (HCC): Status: ACTIVE | Noted: 2022-02-15

## 2022-02-15 PROCEDURE — 3074F SYST BP LT 130 MM HG: CPT | Performed by: FAMILY MEDICINE

## 2022-02-15 PROCEDURE — 90471 IMMUNIZATION ADMIN: CPT | Performed by: FAMILY MEDICINE

## 2022-02-15 PROCEDURE — 3078F DIAST BP <80 MM HG: CPT | Performed by: FAMILY MEDICINE

## 2022-02-15 PROCEDURE — 3008F BODY MASS INDEX DOCD: CPT | Performed by: FAMILY MEDICINE

## 2022-02-15 PROCEDURE — 99396 PREV VISIT EST AGE 40-64: CPT | Performed by: FAMILY MEDICINE

## 2022-02-15 PROCEDURE — 90636 HEP A/HEP B VACC ADULT IM: CPT | Performed by: FAMILY MEDICINE

## 2022-02-15 RX ORDER — SULFACETAMIDE SODIUM AND SULFUR 10; 5 MG/G; MG/G
RINSE TOPICAL DAILY
COMMUNITY
Start: 2021-12-21

## 2022-02-15 RX ORDER — KETOCONAZOLE 20 MG/G
CREAM TOPICAL
COMMUNITY
Start: 2021-11-05 | End: 2022-02-15 | Stop reason: ALTCHOICE

## 2022-02-15 RX ORDER — KETOCONAZOLE 20 MG/ML
SHAMPOO TOPICAL
COMMUNITY
Start: 2021-11-05 | End: 2022-02-15 | Stop reason: ALTCHOICE

## 2022-02-16 ENCOUNTER — TELEPHONE (OUTPATIENT)
Dept: INTERNAL MEDICINE CLINIC | Facility: CLINIC | Age: 52
End: 2022-02-16

## 2022-02-16 ENCOUNTER — OFFICE VISIT (OUTPATIENT)
Dept: SURGERY | Facility: CLINIC | Age: 52
End: 2022-02-16
Payer: COMMERCIAL

## 2022-02-16 VITALS
BODY MASS INDEX: 34 KG/M2 | RESPIRATION RATE: 18 BRPM | WEIGHT: 188.19 LBS | SYSTOLIC BLOOD PRESSURE: 119 MMHG | DIASTOLIC BLOOD PRESSURE: 77 MMHG | OXYGEN SATURATION: 100 % | HEART RATE: 80 BPM | TEMPERATURE: 98 F

## 2022-02-16 DIAGNOSIS — K75.81 LIVER CIRRHOSIS SECONDARY TO NASH (HCC): Primary | ICD-10-CM

## 2022-02-16 DIAGNOSIS — K74.60 LIVER CIRRHOSIS SECONDARY TO NASH (HCC): Primary | ICD-10-CM

## 2022-02-16 LAB
ABSOLUTE BASOPHILS: 81
ABSOLUTE EOSINOPHILS: 9
ABSOLUTE LYMPHOCYTES: 860
ABSOLUTE MONOCYTES: 230
ALBUMIN/GLOBULIN RATIO: 1.6
ALBUMIN: 3.9 G/DL
ALKALINE PHOSPHATASE: 76
ALPHA FETOPROTEIN,$TUMOR MARKER: 8.3
ALT (SGPT): 19 IU/L
AST: 22
BASOPHIL %: 0.4
BILIRUBIN, TOTAL: 1.5 MG/DL
BUN/CREATININE RATIO: 22
BUN: 11
CALCIUM: 9.1
CARBON DIOXIDE: 29
CHLORIDE: 105
CREATININE: 0.49 MG/DL
EGFR IF AFRICN AM: 129
EGFR IF NONAFRICN AM: 111
EOSINOPHIL %: 3.5
GLOBULIN: 2.4
GLUCOSE: 95
HCT: 39.4
HGB: 13.1
LYMPHOCYTE %: 37.4
MEAN CELL VOLUME: 86.8
MEAN CORPUSCULAR HEMOGLOBIN: 28.9
MEAN CORPUSCULAR HGB CONC: 33.2
MEAN PLATELET VOLUME: 12.3
MONOCYTE %: 10
NEUTROPHIL %: 48.7
NEUTROPHIL ABSOLUTE: 1120
PLT: 74
POTASSIUM: 3.8
RED BLOOD COUNT: 4.54
RED CELL DISTRIBUTION WIDTH: 13.4
TOTAL PROTEIN: 6.3
WBC: 2.3

## 2022-02-22 RX ORDER — LEVOTHYROXINE SODIUM 0.12 MG/1
TABLET ORAL
Qty: 30 TABLET | Refills: 0 | Status: SHIPPED | OUTPATIENT
Start: 2022-02-22 | End: 2022-03-01 | Stop reason: DRUGHIGH

## 2022-03-06 LAB
ABSOLUTE BASOPHILS: 9 CELLS/UL (ref 0–200)
ABSOLUTE EOSINOPHILS: 61 CELLS/UL (ref 15–500)
ABSOLUTE LYMPHOCYTES: 1125 CELLS/UL (ref 850–3900)
ABSOLUTE MONOCYTES: 223 CELLS/UL (ref 200–950)
ABSOLUTE NEUTROPHILS: 1482 CELLS/UL (ref 1500–7800)
ALBUMIN/GLOBULIN RATIO: 1.7 (CALC) (ref 1–2.5)
ALBUMIN: 4.3 G/DL (ref 3.6–5.1)
ALKALINE PHOSPHATASE: 80 U/L (ref 37–153)
ALT: 25 U/L (ref 6–29)
AST: 29 U/L (ref 10–35)
BASOPHILS: 0.3 %
BILIRUBIN, TOTAL: 2 MG/DL (ref 0.2–1.2)
BUN: 9 MG/DL (ref 7–25)
CALCIUM: 9.4 MG/DL (ref 8.6–10.4)
CARBON DIOXIDE: 26 MMOL/L (ref 20–32)
CHLORIDE: 104 MMOL/L (ref 98–110)
CHOL/HDLC RATIO: 2.7 (CALC)
CHOLESTEROL, TOTAL: 206 MG/DL
CREATININE: 0.5 MG/DL (ref 0.5–1.05)
EGFR IF AFRICN AM: 128 ML/MIN/1.73M2
EGFR IF NONAFRICN AM: 110 ML/MIN/1.73M2
EOSINOPHILS: 2.1 %
GLOBULIN: 2.5 G/DL (CALC) (ref 1.9–3.7)
GLUCOSE: 90 MG/DL (ref 65–99)
HDL CHOLESTEROL: 76 MG/DL
HEMATOCRIT: 43.8 % (ref 35–45)
HEMOGLOBIN: 14.7 G/DL (ref 11.7–15.5)
LDL-CHOLESTEROL: 115 MG/DL (CALC)
LYMPHOCYTES: 38.8 %
MCH: 29.6 PG (ref 27–33)
MCHC: 33.6 G/DL (ref 32–36)
MCV: 88.1 FL (ref 80–100)
MONOCYTES: 7.7 %
MPV: 12 FL (ref 7.5–12.5)
NEUTROPHILS: 51.1 %
NON-HDL CHOLESTEROL: 130 MG/DL (CALC)
PLATELET COUNT: 79 THOUSAND/UL (ref 140–400)
POTASSIUM: 4.2 MMOL/L (ref 3.5–5.3)
PROTEIN, TOTAL: 6.8 G/DL (ref 6.1–8.1)
RDW: 13.2 % (ref 11–15)
RED BLOOD CELL COUNT: 4.97 MILLION/UL (ref 3.8–5.1)
SODIUM: 140 MMOL/L (ref 135–146)
TRIGLYCERIDES: 61 MG/DL
WHITE BLOOD CELL COUNT: 2.9 THOUSAND/UL (ref 3.8–10.8)

## 2022-03-08 ENCOUNTER — LAB ENCOUNTER (OUTPATIENT)
Dept: LAB | Age: 52
End: 2022-03-08
Attending: INTERNAL MEDICINE
Payer: COMMERCIAL

## 2022-03-08 DIAGNOSIS — Z01.812 ENCOUNTER FOR PREOPERATIVE SCREENING LABORATORY TESTING FOR COVID-19 VIRUS: ICD-10-CM

## 2022-03-08 DIAGNOSIS — Z20.822 ENCOUNTER FOR PREOPERATIVE SCREENING LABORATORY TESTING FOR COVID-19 VIRUS: ICD-10-CM

## 2022-03-09 LAB — SARS-COV-2 RNA RESP QL NAA+PROBE: NOT DETECTED

## 2022-03-11 ENCOUNTER — HOSPITAL ENCOUNTER (OUTPATIENT)
Facility: HOSPITAL | Age: 52
Setting detail: HOSPITAL OUTPATIENT SURGERY
Discharge: HOME | End: 2022-03-11
Attending: INTERNAL MEDICINE | Admitting: INTERNAL MEDICINE
Payer: COMMERCIAL

## 2022-03-11 VITALS
HEART RATE: 81 BPM | SYSTOLIC BLOOD PRESSURE: 100 MMHG | OXYGEN SATURATION: 94 % | BODY MASS INDEX: 33.86 KG/M2 | DIASTOLIC BLOOD PRESSURE: 56 MMHG | HEIGHT: 62 IN | WEIGHT: 184 LBS | TEMPERATURE: 98 F | RESPIRATION RATE: 16 BRPM

## 2022-03-11 DIAGNOSIS — Z01.812 ENCOUNTER FOR PREOPERATIVE SCREENING LABORATORY TESTING FOR COVID-19 VIRUS: Primary | ICD-10-CM

## 2022-03-11 DIAGNOSIS — Z20.822 ENCOUNTER FOR PREOPERATIVE SCREENING LABORATORY TESTING FOR COVID-19 VIRUS: Primary | ICD-10-CM

## 2022-03-11 DIAGNOSIS — R19.5 POSITIVE COLORECTAL CANCER SCREENING USING COLOGUARD TEST: ICD-10-CM

## 2022-03-11 PROCEDURE — 88305 TISSUE EXAM BY PATHOLOGIST: CPT | Performed by: INTERNAL MEDICINE

## 2022-03-11 PROCEDURE — 99152 MOD SED SAME PHYS/QHP 5/>YRS: CPT | Performed by: INTERNAL MEDICINE

## 2022-03-11 PROCEDURE — 99153 MOD SED SAME PHYS/QHP EA: CPT | Performed by: INTERNAL MEDICINE

## 2022-03-11 PROCEDURE — 0DBL8ZX EXCISION OF TRANSVERSE COLON, VIA NATURAL OR ARTIFICIAL OPENING ENDOSCOPIC, DIAGNOSTIC: ICD-10-PCS | Performed by: INTERNAL MEDICINE

## 2022-03-11 RX ORDER — MIDAZOLAM HYDROCHLORIDE 1 MG/ML
INJECTION INTRAMUSCULAR; INTRAVENOUS
Status: DISCONTINUED | OUTPATIENT
Start: 2022-03-11 | End: 2022-03-11

## 2022-03-11 RX ORDER — SODIUM CHLORIDE, SODIUM LACTATE, POTASSIUM CHLORIDE, CALCIUM CHLORIDE 600; 310; 30; 20 MG/100ML; MG/100ML; MG/100ML; MG/100ML
INJECTION, SOLUTION INTRAVENOUS CONTINUOUS
Status: DISCONTINUED | OUTPATIENT
Start: 2022-03-11 | End: 2022-03-11

## 2022-03-11 NOTE — OPERATIVE REPORT
[facility]  Colonoscopy Report      [facility] Patient Status:  Hospital Outpatient Surgery    1970 MRN [id number]   Location [address] [address] Attending Dioni Balderrama MD       DATE OF OPERATION: 3/11/2022     PREOPERATIVE DIAGNOSIS:     1. Positive Cologuard    POSTOPERATIVE DIAGNOSIS:     1. Colon polyp  2. Diverticulosis     SURGERY PERFORMED:     Colonoscopy, entire colon with cold snare polypectomy    SURGEON: Lily Saeed MD    ANESTHESIA: Versed 9 mg Fentanyl 100 mcg    HISTORY OF PRESENT ILLNESS:      The patient is a 46year old female who has a positive Cologuard. REPORT OF OPERATION:     The procedure was reviewed with the patient. The patient is aware of the indications. The risks of bleeding, perforation and anesthetic complications have been reviewed. The possibility of missed lesions were reviewed. After the patient was placed in the left lateral decubitus position,the Olympus video colonoscope was inserted into the rectum and was advanced to the cecum. The scope was withdrawn and the mucosa was observed for abnormalities. FINDINGS:    The visualized colonic mucosa reveals a 4 mm sessile, distal transverse polyp that was removed using snare polypectomy. Diverticulosis is noted in the sigmoid colon. The remainder of the colon is normal.  At the anal verge, grade 2 internal hemorrhoids are identified. The patient tolerated the procedure well without any immediate complications. Aronchick bowel prep score was 1. (1 - excellent > 95% mucosa seen; 2 - good - clear liquid up to 25% of the mucosa, 90% mucosa seen;  3 - fair - semisolid stool not suctioned, but 90% of the mucosa seen; 4 - poor - semisolid stool not suctioned, but < 90% mucosa seen; 5 - inadequate - repeat prep needed)     The colon withdrawal time was 9 minutes.     Anesthesia time: 19 minutes    A trained sedation nurse was present to assist in monitoring the patient during the entire length of moderate sedation time. RECOMMENDATIONS:    1. Await histology. 2. High fiber diet.

## 2022-03-18 NOTE — PROGRESS NOTES
The patient had a colonoscopy for a positive Cologuard. The colonoscopy reveals one adenoma that was removed. In light of this, her next colonoscopy should be in seven years. Results sent to the patient.

## 2022-04-04 ENCOUNTER — HOSPITAL ENCOUNTER (OUTPATIENT)
Dept: MAMMOGRAPHY | Age: 52
Discharge: HOME | End: 2022-04-04
Attending: FAMILY MEDICINE
Payer: COMMERCIAL

## 2022-04-04 DIAGNOSIS — Z12.31 ENCOUNTER FOR SCREENING MAMMOGRAM FOR MALIGNANT NEOPLASM OF BREAST: ICD-10-CM

## 2022-04-11 RX ORDER — LEVOTHYROXINE SODIUM 112 UG/1
112 TABLET ORAL
Qty: 90 TABLET | Refills: 0 | Status: CANCELLED | OUTPATIENT
Start: 2022-04-11

## 2022-04-11 RX ORDER — LEVOTHYROXINE SODIUM 112 UG/1
112 TABLET ORAL
Qty: 7 TABLET | Refills: 0 | Status: SHIPPED | OUTPATIENT
Start: 2022-04-11

## 2022-04-28 ENCOUNTER — HOSPITAL ENCOUNTER (OUTPATIENT)
Dept: MAMMOGRAPHY | Age: 52
Discharge: HOME | End: 2022-04-28
Attending: FAMILY MEDICINE
Payer: COMMERCIAL

## 2022-04-28 PROCEDURE — 77063 BREAST TOMOSYNTHESIS BI: CPT | Performed by: FAMILY MEDICINE

## 2022-04-28 PROCEDURE — 77067 SCR MAMMO BI INCL CAD: CPT | Performed by: FAMILY MEDICINE

## 2022-04-29 RX ORDER — LEVOTHYROXINE SODIUM 112 UG/1
112 TABLET ORAL
Qty: 30 TABLET | Refills: 0 | Status: SHIPPED | OUTPATIENT
Start: 2022-04-29

## 2022-04-29 NOTE — TELEPHONE ENCOUNTER
Patient states that she is out of Levothyroxine and she is going for blood work at [facility] 4/30/2022     Patient asked that we sent a refill to get her until she has blood work done so she does not miss a dose    States she has not taken for 2 days (including today)     [facility]

## 2022-05-08 LAB
T4, FREE: 1.4 NG/DL (ref 0.8–1.8)
TSH W/REFLEX TO FT4: 0.33 MIU/L

## 2022-05-11 ENCOUNTER — TELEPHONE (OUTPATIENT)
Dept: INTERNAL MEDICINE CLINIC | Facility: CLINIC | Age: 52
End: 2022-05-11

## 2022-05-11 DIAGNOSIS — E03.9 HYPOTHYROIDISM, UNSPECIFIED TYPE: Primary | ICD-10-CM

## 2022-05-11 RX ORDER — LEVOTHYROXINE SODIUM 0.1 MG/1
100 TABLET ORAL DAILY
Qty: 30 TABLET | Refills: 1 | Status: SHIPPED | OUTPATIENT
Start: 2022-05-11

## 2022-05-11 NOTE — TELEPHONE ENCOUNTER
Spoke with patient in regards to lab results. Orders placed, [facility] labs printed and placed in mail box in front office.

## 2022-06-15 ENCOUNTER — OFFICE VISIT (OUTPATIENT)
Dept: SURGERY | Facility: CLINIC | Age: 52
End: 2022-06-15
Payer: COMMERCIAL

## 2022-06-27 DIAGNOSIS — K70.30 ALCOHOLIC CIRRHOSIS, UNSPECIFIED WHETHER ASCITES PRESENT (HCC): ICD-10-CM

## 2022-06-27 RX ORDER — SPIRONOLACTONE 50 MG/1
50 TABLET, FILM COATED ORAL DAILY
Qty: 30 TABLET | Refills: 11 | Status: SHIPPED | OUTPATIENT
Start: 2022-06-27

## 2022-06-30 ENCOUNTER — TELEPHONE (OUTPATIENT)
Dept: INTERNAL MEDICINE CLINIC | Facility: CLINIC | Age: 52
End: 2022-06-30

## 2022-06-30 NOTE — TELEPHONE ENCOUNTER
SM, please advise: OV first?     Spoke with pt. About a year ago pt developed a \"bump\" on inner thigh. Red, puss drainage, size of nickel. Pt thought it was a ingrown hair. Intermittent flare ups now. Pt can feel lump under the skin, red, but no longer drainage. Seems to flare up on hot days. Intermittent pain. Does not want to come in for assessment or go to derm. Pt feels its a fatty tumor and just wants it removed. Wants referral to surgeon. I stated it's best to have it looked at first as it may not need surgery.

## 2022-06-30 NOTE — TELEPHONE ENCOUNTER
If Pt wants to see a surgeon she can she has a PPO.  I suggest she sees Dr. Keven Gavin or Dr. Antonio Norton or anyone in their office

## 2022-06-30 NOTE — TELEPHONE ENCOUNTER
LMTCB.  Stated that  recommends an OV first, but because she has a PPO, she can set up a consult with Dr. Riley Lozano or Dr. Miguel Leigh (surgeons)

## 2022-06-30 NOTE — TELEPHONE ENCOUNTER
Pt calling for medical advice. She declined coming in office for a cyst. She is unsure if she needs to see a surgeon and if doctor can recommend a surgeon. Does pt need to be evaluated?  Last seen 2/2022

## 2022-07-07 DIAGNOSIS — E03.9 HYPOTHYROIDISM, UNSPECIFIED TYPE: ICD-10-CM

## 2022-07-07 RX ORDER — LEVOTHYROXINE SODIUM 0.1 MG/1
100 TABLET ORAL DAILY
Qty: 30 TABLET | Refills: 0 | Status: SHIPPED | OUTPATIENT
Start: 2022-07-07

## 2022-07-07 NOTE — TELEPHONE ENCOUNTER
[facility] sent to pt stating she is due for thyroid lab work. 1 month only sent to pharmacy    Levothyroxine 100 mcg  Thyroid Supplements Protocol Failed 07/07/2022 04:11 AM   Protocol Details  TSH value between 0.350 and 5.500 IU/ml   Filled 5-11-22  Qty 30  1 refill  No upcoming appt.   LOV 2-15-22

## 2022-08-03 PROCEDURE — 87624 HPV HI-RISK TYP POOLED RSLT: CPT

## 2022-08-04 DIAGNOSIS — E03.9 HYPOTHYROIDISM, UNSPECIFIED TYPE: ICD-10-CM

## 2022-08-04 RX ORDER — LEVOTHYROXINE SODIUM 0.1 MG/1
100 TABLET ORAL DAILY
Qty: 30 TABLET | Refills: 0 | Status: SHIPPED | OUTPATIENT
Start: 2022-08-04 | End: 2022-08-09

## 2022-08-07 LAB — TSH W/REFLEX TO FT4: 3.31 MIU/L

## 2022-08-08 ENCOUNTER — TELEPHONE (OUTPATIENT)
Dept: SURGERY | Facility: CLINIC | Age: 52
End: 2022-08-08

## 2022-08-08 DIAGNOSIS — K74.60 LIVER CIRRHOSIS SECONDARY TO NASH (HCC): Primary | ICD-10-CM

## 2022-08-08 DIAGNOSIS — K70.30 ALCOHOLIC CIRRHOSIS OF LIVER WITHOUT ASCITES (HCC): ICD-10-CM

## 2022-08-08 DIAGNOSIS — K75.81 LIVER CIRRHOSIS SECONDARY TO NASH (HCC): Primary | ICD-10-CM

## 2022-08-08 NOTE — TELEPHONE ENCOUNTER
Returned call regarding cramping throughout legs and body. Discussed possibly due to diuretics. Labs May '22 with electrolytes wnl. Updated labs ordered - will ask office to mail to patient's home. Suggest that patient reduce diuretic dosing frequency- try taking every-other-day or less if possible. Also encouraged not to drink excessive fluid just in case NA is low.   Patient will inform office once labs are completed at [address] [place] [address]  Nurse Practitioner, Hepatology  [phone] (office)

## 2022-08-09 DIAGNOSIS — E03.9 HYPOTHYROIDISM, UNSPECIFIED TYPE: ICD-10-CM

## 2022-08-09 RX ORDER — LEVOTHYROXINE SODIUM 0.1 MG/1
100 TABLET ORAL DAILY
Qty: 90 TABLET | Refills: 1 | Status: SHIPPED | OUTPATIENT
Start: 2022-08-09

## 2022-09-03 DIAGNOSIS — E03.9 HYPOTHYROIDISM, UNSPECIFIED TYPE: ICD-10-CM

## 2022-09-03 RX ORDER — LEVOTHYROXINE SODIUM 0.1 MG/1
100 TABLET ORAL
Qty: 30 TABLET | Refills: 5 | Status: SHIPPED | OUTPATIENT
Start: 2022-09-03 | End: 2023-03-02

## 2022-10-10 DIAGNOSIS — K70.30 ALCOHOLIC CIRRHOSIS, UNSPECIFIED WHETHER ASCITES PRESENT (HCC): ICD-10-CM

## 2022-10-10 RX ORDER — SPIRONOLACTONE 50 MG/1
50 TABLET, FILM COATED ORAL DAILY
Qty: 30 TABLET | Refills: 5 | Status: SHIPPED | OUTPATIENT
Start: 2022-10-10

## 2022-10-10 RX ORDER — SPIRONOLACTONE 50 MG/1
50 TABLET, FILM COATED ORAL DAILY
Qty: 30 TABLET | Refills: 5 | Status: SHIPPED | OUTPATIENT
Start: 2022-10-10 | End: 2022-10-10

## 2022-11-27 LAB
ALBUMIN/GLOBULIN RATIO: 1.9 (CALC) (ref 1–2.5)
ALBUMIN: 4.4 G/DL (ref 3.6–5.1)
ALKALINE PHOSPHATASE: 87 U/L (ref 37–153)
ALT: 52 U/L (ref 6–29)
AST: 52 U/L (ref 10–35)
BILIRUBIN, TOTAL: 1.9 MG/DL (ref 0.2–1.2)
BUN: 10 MG/DL (ref 7–25)
CALCIUM: 8.8 MG/DL (ref 8.6–10.4)
CARBON DIOXIDE: 26 MMOL/L (ref 20–32)
CHLORIDE: 106 MMOL/L (ref 98–110)
CREATININE: 0.5 MG/DL (ref 0.5–1.03)
EGFR: 111 ML/MIN/1.73M2
GLOBULIN: 2.3 G/DL (CALC) (ref 1.9–3.7)
GLUCOSE: 90 MG/DL (ref 65–99)
POTASSIUM: 4 MMOL/L (ref 3.5–5.3)
PROTEIN, TOTAL: 6.7 G/DL (ref 6.1–8.1)
SODIUM: 141 MMOL/L (ref 135–146)

## 2022-12-05 ENCOUNTER — OFFICE VISIT (OUTPATIENT)
Dept: SURGERY | Facility: CLINIC | Age: 52
End: 2022-12-05
Payer: COMMERCIAL

## 2022-12-05 VITALS
HEART RATE: 83 BPM | SYSTOLIC BLOOD PRESSURE: 101 MMHG | OXYGEN SATURATION: 97 % | HEIGHT: 62 IN | RESPIRATION RATE: 16 BRPM | WEIGHT: 200 LBS | BODY MASS INDEX: 36.8 KG/M2 | DIASTOLIC BLOOD PRESSURE: 66 MMHG

## 2023-05-06 ENCOUNTER — HOSPITAL ENCOUNTER (OUTPATIENT)
Dept: MAMMOGRAPHY | Age: 53
Discharge: HOME | End: 2023-05-06
Payer: COMMERCIAL

## 2023-05-06 DIAGNOSIS — Z12.31 ENCOUNTER FOR SCREENING MAMMOGRAM FOR MALIGNANT NEOPLASM OF BREAST: ICD-10-CM

## 2023-05-06 PROCEDURE — 77063 BREAST TOMOSYNTHESIS BI: CPT

## 2023-05-06 PROCEDURE — 77067 SCR MAMMO BI INCL CAD: CPT

## 2023-05-18 ENCOUNTER — TELEMEDICINE (OUTPATIENT)
Dept: INTERNAL MEDICINE CLINIC | Facility: CLINIC | Age: 53
End: 2023-05-18
Payer: COMMERCIAL

## 2023-05-18 DIAGNOSIS — J22 ACUTE LOWER RESPIRATORY INFECTION: Primary | ICD-10-CM

## 2023-05-18 PROCEDURE — 99213 OFFICE O/P EST LOW 20 MIN: CPT | Performed by: FAMILY MEDICINE

## 2023-05-18 RX ORDER — CODEINE PHOSPHATE AND GUAIFENESIN 10; 100 MG/5ML; MG/5ML
5 SOLUTION ORAL EVERY 6 HOURS PRN
Qty: 120 ML | Refills: 0 | Status: SHIPPED | OUTPATIENT
Start: 2023-05-18

## 2023-05-18 RX ORDER — AZITHROMYCIN 250 MG/1
TABLET, FILM COATED ORAL
Qty: 6 TABLET | Refills: 0 | Status: SHIPPED | OUTPATIENT
Start: 2023-05-18 | End: 2023-05-23

## 2023-05-31 ENCOUNTER — TELEPHONE (OUTPATIENT)
Dept: INTERNAL MEDICINE CLINIC | Facility: CLINIC | Age: 53
End: 2023-05-31

## 2023-05-31 NOTE — TELEPHONE ENCOUNTER
Spoke with pt. Pt is on vacation in [place]. X 3 days pt has had diarrhea and some vomiting.   -diarrhea is watery and all day long.   -pt feels \"clammy\" , but does not have thermometer to check for fever.  -pt finished ABX about a week ago. Informed her it could be a virus and it just needs to run it's course. It could be from eating out or food poisoning. Advised her to try Imodium since pepto didn't seem to help. -stated to stay hydrated, drink fluids, slowly re-introduce bland food into system. If diarrhea does not subside, advised going to IC for fluids or further testing.      She v/u

## 2023-05-31 NOTE — TELEPHONE ENCOUNTER
Pt stated she has been sick with nausea and diarrhea for the past 3 days. Pt stopped taking the cheratussin because it was upsetting her stomach. Pt unsure as to what is causing the diarrhea. Pt requesting call back for further recommendations.

## 2023-07-03 NOTE — TELEPHONE ENCOUNTER
Pt called stating that she doesn't understand how she is supposed to get more medication refills because she is too busy and doesn't have time to come in for a physical. Pt stated she will go get lab work done if we put in order to [address] but she cannot fin Requested medication(s) are due for refill today: Yes  Patient has already received a courtesy refill: No  Other reason request has been forwarded to provider:

## 2023-07-17 ENCOUNTER — OFFICE VISIT (OUTPATIENT)
Dept: SURGERY | Facility: CLINIC | Age: 53
End: 2023-07-17

## 2023-07-17 VITALS
SYSTOLIC BLOOD PRESSURE: 122 MMHG | OXYGEN SATURATION: 97 % | HEART RATE: 82 BPM | RESPIRATION RATE: 14 BRPM | DIASTOLIC BLOOD PRESSURE: 80 MMHG

## 2023-08-22 ENCOUNTER — PATIENT MESSAGE (OUTPATIENT)
Dept: INTERNAL MEDICINE CLINIC | Facility: CLINIC | Age: 53
End: 2023-08-22

## 2023-08-22 ENCOUNTER — NURSE TRIAGE (OUTPATIENT)
Dept: INTERNAL MEDICINE CLINIC | Facility: CLINIC | Age: 53
End: 2023-08-22

## 2023-08-22 NOTE — TELEPHONE ENCOUNTER
SM- please see RN Triage TE from today. Patient also sent this [facility] to show bug bites and see if you would still be willing to keep VV for 8/24.

## 2023-08-22 NOTE — TELEPHONE ENCOUNTER
Action Requested: Summary for Provider     []  Critical Lab, Recommendations Needed  [x] Need Additional Advice  []   FYI    []   Need Orders  [] Need Medications Sent to Pharmacy  []  Other     SUMMARY: Patient reports 2 days ago while in [place] she was bitten by noseeums. Today she developed bilateral foot/lower leg swelling making it difficult for her to wear shoes and move around comfortably. Denies itchiness, no open areas, no drainage, no general redness or warmth/tenderness to touch, but does have \"scattered red dots-non raised over entire lower legs/feet. Explained to patient per  need for in-person OV to properly assess. Patient [place] not [place] returning to [place] until 8[place]23 pm an will be unable to come in for OV before 4pm this coming Thursday/Friday. Patient wants to keep VV visit. States she has had this happen before and was prescribed steroids. Explained protocol recommends she be evaluated in person either today or tomorrow. Suggested patient be seen at urgent care/walk in care while in [place] today or tomorrow. Patient confirms she will check with her insurance and if covered will go. Patient plans to send [facility] for 's review to still see if VV could be kept for 8/24. Reason for call: Appt Question  Onset: Data Unavailable                     Reason for Disposition   Patient wants to be seen    Protocols used:  Insect Bite-A-[place]

## 2023-08-22 NOTE — TELEPHONE ENCOUNTER
Spoke with patient via phone. Conveyed SM's recommendations on need to either be seen in [place] at urgent care or in person when she returns to [place]. Patient verbalized understanding. Encouraged patient again to check with insurance on coverage and proceed to walk-in care/urgent care while in [place] to be evaluated. EMG 8 Front- Desk   -please cancel VV patient had previously scheduled with  for 8/24. TY!

## 2023-08-22 NOTE — TELEPHONE ENCOUNTER
Spoke with patient via phone. Shared 's recommendations to either be seen in [place] or in-person when returning to [place]. Patient verbalized understanding. Encouraged patient to check with insurance and proceed to walk-in care/urgent care in [place] for further evaluation. Patient verbalized understanding.

## 2023-08-22 NOTE — TELEPHONE ENCOUNTER
From: Leslie Ramirez  To: YURI Merrill  Sent: 8/22/2023 4:55 PM CDT  Subject: Insect bites    Moira attached are photos of bites. I am really swelling up.

## 2023-08-22 NOTE — TELEPHONE ENCOUNTER
Pt scheduled a VV for Thursday at 1pm for bug bites she received out of state that are causing swelling, pt cannot come in office due to work.  Pt stated 'she should be fine with a video because she can clearly see them on the phone'    Please advise that VV is ok for this     Future Appointments   Date Time Provider Raza Sari   8/24/2023  1:00 PM YURI Salinas EMG 8 EMG [place]   8/28/2023  5:30 PM YURI Bartholomew [address] [facility]   11/13/2023  9:00 AM Francisco Apley, APRN Earvin [address] [id number]   2/5/2024  3:15 PM Mckenna Vergara MD [address] [facility] EMG Surg/Onc

## 2023-08-29 DIAGNOSIS — E03.9 HYPOTHYROIDISM, UNSPECIFIED TYPE: ICD-10-CM

## 2023-08-29 RX ORDER — LEVOTHYROXINE SODIUM 0.1 MG/1
100 TABLET ORAL DAILY
Qty: 90 TABLET | Refills: 3 | OUTPATIENT
Start: 2023-08-29

## 2023-10-21 DIAGNOSIS — E03.9 HYPOTHYROIDISM, UNSPECIFIED TYPE: ICD-10-CM

## 2023-10-21 RX ORDER — LEVOTHYROXINE SODIUM 0.1 MG/1
100 TABLET ORAL
Qty: 30 TABLET | Refills: 5 | OUTPATIENT
Start: 2023-10-21

## 2023-10-21 NOTE — TELEPHONE ENCOUNTER
The original prescription was discontinued on 3/2/2023 by YURI Urias. Renewing this prescription may not be appropriate.    Levothyroxine 100 mcg    Increased dose to 112mcg    [facility] sent to pt to schedule CPX, has not been seen in over a year

## 2023-10-30 ENCOUNTER — TELEPHONE (OUTPATIENT)
Dept: INTERNAL MEDICINE CLINIC | Facility: CLINIC | Age: 53
End: 2023-10-30

## 2023-10-30 RX ORDER — LEVOTHYROXINE SODIUM 0.1 MG/1
100 TABLET ORAL
COMMUNITY
Start: 2023-09-23 | End: 2023-10-30

## 2023-10-30 RX ORDER — LEVOTHYROXINE SODIUM 0.1 MG/1
100 TABLET ORAL
Qty: 90 TABLET | Refills: 0 | Status: SHIPPED | OUTPATIENT
Start: 2023-10-30

## 2023-10-30 NOTE — TELEPHONE ENCOUNTER
Appt scheduled for cpx     Patient needs refill of levothyroxine prior to appt  100mcg current dose  Last TSH 8/6/22  Patient had labs drawn in May 2023 from Dr. Bijal Campos (not including TSH)

## 2023-11-02 ENCOUNTER — TELEPHONE (OUTPATIENT)
Dept: INTERNAL MEDICINE CLINIC | Facility: CLINIC | Age: 53
End: 2023-11-02

## 2023-11-02 DIAGNOSIS — E55.9 VITAMIN D DEFICIENCY: ICD-10-CM

## 2023-11-02 DIAGNOSIS — Z00.00 LABORATORY EXAMINATION ORDERED AS PART OF A ROUTINE GENERAL MEDICAL EXAMINATION: Primary | ICD-10-CM

## 2023-11-02 NOTE — TELEPHONE ENCOUNTER
Patient calling to request orders for her upcoming CPX appt. Future Appointments   Date Time Provider Raza Garciai   11/13/2023  9:00 AM YURI Nielsen [facility] [id number]   12/4/2023  5:00 PM YURI Munoz EMG 8 EMG [place]   2/5/2024  3:15 PM Yeny Clark MD [facility] EMG Surg/Onc     Patient is calling to request TSH specifically.  She would like for us to mail the order to her home as she would like to have the labs drawn [facility].

## 2023-11-12 ENCOUNTER — PATIENT MESSAGE (OUTPATIENT)
Dept: INTERNAL MEDICINE CLINIC | Facility: CLINIC | Age: 53
End: 2023-11-12

## 2023-11-13 ENCOUNTER — OFFICE VISIT (OUTPATIENT)
Dept: INTERNAL MEDICINE CLINIC | Facility: CLINIC | Age: 53
End: 2023-11-13
Payer: COMMERCIAL

## 2023-11-13 VITALS
OXYGEN SATURATION: 94 % | HEIGHT: 62.5 IN | RESPIRATION RATE: 18 BRPM | WEIGHT: 232 LBS | SYSTOLIC BLOOD PRESSURE: 132 MMHG | HEART RATE: 80 BPM | DIASTOLIC BLOOD PRESSURE: 70 MMHG | BODY MASS INDEX: 41.62 KG/M2

## 2023-11-13 DIAGNOSIS — E66.01 CLASS 3 SEVERE OBESITY WITH SERIOUS COMORBIDITY AND BODY MASS INDEX (BMI) OF 40.0 TO 44.9 IN ADULT, UNSPECIFIED OBESITY TYPE (HCC): ICD-10-CM

## 2023-11-13 DIAGNOSIS — E03.9 HYPOTHYROIDISM, UNSPECIFIED TYPE: ICD-10-CM

## 2023-11-13 DIAGNOSIS — K74.60 CIRRHOSIS OF LIVER WITHOUT ASCITES, UNSPECIFIED HEPATIC CIRRHOSIS TYPE (HCC): ICD-10-CM

## 2023-11-13 DIAGNOSIS — Z51.81 ENCOUNTER FOR THERAPEUTIC DRUG MONITORING: Primary | ICD-10-CM

## 2023-11-13 PROBLEM — E66.813 CLASS 3 SEVERE OBESITY WITH SERIOUS COMORBIDITY AND BODY MASS INDEX (BMI) OF 40.0 TO 44.9 IN ADULT (HCC): Status: ACTIVE | Noted: 2023-11-13

## 2023-11-13 PROBLEM — E66.813 CLASS 3 SEVERE OBESITY WITH SERIOUS COMORBIDITY AND BODY MASS INDEX (BMI) OF 40.0 TO 44.9 IN ADULT: Status: ACTIVE | Noted: 2023-11-13

## 2023-11-13 RX ORDER — SEMAGLUTIDE 0.25 MG/.5ML
0.25 INJECTION, SOLUTION SUBCUTANEOUS WEEKLY
Qty: 6 ML | Refills: 0 | Status: SHIPPED | OUTPATIENT
Start: 2023-11-13

## 2023-11-17 RX ORDER — METFORMIN HYDROCHLORIDE 750 MG/1
1500 TABLET, EXTENDED RELEASE ORAL
Qty: 60 TABLET | Refills: 2 | Status: SHIPPED | OUTPATIENT
Start: 2023-11-17

## 2023-11-23 LAB
T4, FREE: 1.1 NG/DL (ref 0.8–1.8)
TSH W/REFLEX TO FT4: 10.82 MIU/L
VITAMIN B12: 636 PG/ML (ref 200–1100)
VITAMIN D, 25-OH, TOTAL: 26 NG/ML (ref 30–100)

## 2023-11-24 ENCOUNTER — TELEPHONE (OUTPATIENT)
Dept: INTERNAL MEDICINE CLINIC | Facility: CLINIC | Age: 53
End: 2023-11-24

## 2023-11-24 DIAGNOSIS — E03.9 HYPOTHYROIDISM, UNSPECIFIED TYPE: Primary | ICD-10-CM

## 2023-11-24 NOTE — TELEPHONE ENCOUNTER
Patient called after receiving lab results for Thyroid and Vitamin D. Patient is concerned about thyroid levels and slightly concerned about vitamin D. Patient would like a call about results. Please advise.

## 2023-11-24 NOTE — TELEPHONE ENCOUNTER
Spoke to patient informed lab results from 11/22 have not been reviewed by SM yet. Patient states does not take any biotin supplements and has been taking Levothyroxine 100 mcg daily before breakfast.   Patient ok with [facility] new directions.      11/22/23  TSH 10.82  Vitamin D 26

## 2023-11-27 RX ORDER — LEVOTHYROXINE SODIUM 112 UG/1
112 TABLET ORAL
Qty: 30 TABLET | Refills: 1 | Status: SHIPPED | OUTPATIENT
Start: 2023-11-27

## 2023-11-27 NOTE — TELEPHONE ENCOUNTER
Chadwick Wesley, APRN  11/26/2023  9:48 AM CST Back to Top      TSH way up. Since Pt has been taking her levothyroxine daily and not taking any vitamins or supplements lets increase her dose to 112 mcg daily #30 with 1 refill. Recheck TSH in 6-8 weeks.

## 2023-11-27 NOTE — TELEPHONE ENCOUNTER
Spoke to patient via phone regarding results/recommendations from provider. Patient voiced understanding. Order mailed to home address.

## 2024-01-10 ENCOUNTER — PATIENT MESSAGE (OUTPATIENT)
Dept: INTERNAL MEDICINE CLINIC | Facility: CLINIC | Age: 54
End: 2024-01-10

## 2024-01-10 DIAGNOSIS — Z51.81 ENCOUNTER FOR THERAPEUTIC DRUG MONITORING: Primary | ICD-10-CM

## 2024-01-10 DIAGNOSIS — E66.01 CLASS 3 SEVERE OBESITY WITH SERIOUS COMORBIDITY AND BODY MASS INDEX (BMI) OF 40.0 TO 44.9 IN ADULT, UNSPECIFIED OBESITY TYPE (HCC): ICD-10-CM

## 2024-01-10 RX ORDER — LEVOTHYROXINE SODIUM 0.1 MG/1
100 TABLET ORAL
Qty: 90 TABLET | Refills: 3 | OUTPATIENT
Start: 2024-01-10

## 2024-01-10 NOTE — TELEPHONE ENCOUNTER
Wrong dose, was adjusted 11/2023 to 112 mcg daily.   Pt is OVERDUE for CPX and labs    levothyroxine 100 mcg    The original prescription was discontinued on 11/27/2023 by Renita Echevarria, RN. Renewing this prescription may not be appropriate.       Thyroid Supplements Protocol [id number]/09/2024 11:45 PM   Protocol Details TSH value between 0.350 and 5.500 IU/ml    Appointment in past 12 or next 3 months        Future Appointments   Date Time Provider Department Center   2/5/2024  3:15 PM James Coronel MD [facility] Surg/Onc   2/21/2024  9:20 AM Paulina Arellano APRN [facility]   LOV 02-15-22

## 2024-01-16 RX ORDER — TIRZEPATIDE 5 MG/.5ML
5 INJECTION, SOLUTION SUBCUTANEOUS WEEKLY
Qty: 2 ML | Refills: 1 | Status: SHIPPED | OUTPATIENT
Start: 2024-01-16

## 2024-01-16 RX ORDER — TIRZEPATIDE 2.5 MG/.5ML
2.5 INJECTION, SOLUTION SUBCUTANEOUS WEEKLY
Qty: 2 ML | Refills: 0 | Status: SHIPPED | OUTPATIENT
Start: 2024-01-16

## 2024-01-18 RX ORDER — LEVOTHYROXINE SODIUM 112 UG/1
112 TABLET ORAL
Qty: 30 TABLET | Refills: 0 | Status: SHIPPED | OUTPATIENT
Start: 2024-01-18

## 2024-01-18 NOTE — TELEPHONE ENCOUNTER
Levothyroxine 112 mcg    Thyroid Supplements Protocol Omprwc1901/18/2024 12:19 AM   Protocol Details TSH value between 0.350 and 5.500 IU/ml    Appointment in past 12 or next 3 months      Filled 11-27-23  Qty 30  1 refill  Future Appointments   Date Time Provider Department Center   2/5/2024  3:15 PM James Coronel MD [facility] Surg/Onc   2/21/2024  9:20 AM Paulina Arellano APRN [facility] EMG [address] 2-15-22

## 2024-01-21 LAB — TSH W/REFLEX TO FT4: 2.26 MIU/L

## 2024-01-23 ENCOUNTER — TELEPHONE (OUTPATIENT)
Dept: INTERNAL MEDICINE CLINIC | Facility: CLINIC | Age: 54
End: 2024-01-23

## 2024-01-23 RX ORDER — LEVOTHYROXINE SODIUM 112 UG/1
112 TABLET ORAL
Qty: 90 TABLET | Refills: 1 | OUTPATIENT
Start: 2024-01-23

## 2024-01-23 NOTE — TELEPHONE ENCOUNTER
Patient calling to request 90 day supply and [facility]. Since Moira wants to keep her on the same dose for 6 months.     Disp Refills Start End    levothyroxine 112 MCG Oral Tab 30 tablet 0 1/18/2024 --    Sig - Route: Take 1 tablet (112 mcg total) by mouth before breakfast.

## 2024-01-31 RX ORDER — LEVOTHYROXINE SODIUM 112 UG/1
112 TABLET ORAL
Qty: 90 TABLET | Refills: 1 | Status: SHIPPED | OUTPATIENT
Start: 2024-01-31

## 2024-01-31 NOTE — TELEPHONE ENCOUNTER
Patient called to check status of refill for    90 day request-levothyroxine 112 MCG Oral Tab     [facility] - [address] [phone], [phone]     First request was on 1/23/24    Please send rx if appropriate.

## 2024-02-05 ENCOUNTER — OFFICE VISIT (OUTPATIENT)
Dept: SURGERY | Facility: CLINIC | Age: 54
End: 2024-02-05

## 2024-02-05 VITALS
TEMPERATURE: 98 F | HEART RATE: 94 BPM | DIASTOLIC BLOOD PRESSURE: 75 MMHG | SYSTOLIC BLOOD PRESSURE: 114 MMHG | RESPIRATION RATE: 16 BRPM | WEIGHT: 212.63 LBS | BODY MASS INDEX: 38 KG/M2 | OXYGEN SATURATION: 98 %

## 2024-02-05 DIAGNOSIS — K74.60 CIRRHOSIS OF LIVER WITHOUT ASCITES, UNSPECIFIED HEPATIC CIRRHOSIS TYPE (HCC): Primary | ICD-10-CM

## 2024-02-21 ENCOUNTER — OFFICE VISIT (OUTPATIENT)
Dept: INTERNAL MEDICINE CLINIC | Facility: CLINIC | Age: 54
End: 2024-02-21
Payer: COMMERCIAL

## 2024-02-21 VITALS
HEART RATE: 95 BPM | WEIGHT: 215 LBS | HEIGHT: 62.5 IN | BODY MASS INDEX: 38.57 KG/M2 | SYSTOLIC BLOOD PRESSURE: 110 MMHG | DIASTOLIC BLOOD PRESSURE: 64 MMHG | RESPIRATION RATE: 16 BRPM

## 2024-02-21 DIAGNOSIS — R12 HEARTBURN: ICD-10-CM

## 2024-02-21 DIAGNOSIS — K74.60 CIRRHOSIS OF LIVER WITHOUT ASCITES, UNSPECIFIED HEPATIC CIRRHOSIS TYPE (HCC): ICD-10-CM

## 2024-02-21 DIAGNOSIS — E66.01 CLASS 3 SEVERE OBESITY WITH SERIOUS COMORBIDITY AND BODY MASS INDEX (BMI) OF 40.0 TO 44.9 IN ADULT, UNSPECIFIED OBESITY TYPE (HCC): ICD-10-CM

## 2024-02-21 DIAGNOSIS — E03.9 HYPOTHYROIDISM, UNSPECIFIED TYPE: ICD-10-CM

## 2024-02-21 DIAGNOSIS — Z51.81 ENCOUNTER FOR THERAPEUTIC DRUG MONITORING: Primary | ICD-10-CM

## 2024-02-21 DIAGNOSIS — R14.0 BLOATING: ICD-10-CM

## 2024-02-21 PROCEDURE — 3074F SYST BP LT 130 MM HG: CPT | Performed by: NURSE PRACTITIONER

## 2024-02-21 PROCEDURE — 3078F DIAST BP <80 MM HG: CPT | Performed by: NURSE PRACTITIONER

## 2024-02-21 PROCEDURE — 3008F BODY MASS INDEX DOCD: CPT | Performed by: NURSE PRACTITIONER

## 2024-02-21 PROCEDURE — 99213 OFFICE O/P EST LOW 20 MIN: CPT | Performed by: NURSE PRACTITIONER

## 2024-02-21 RX ORDER — CALCIPOTRIENE AND BETAMETHASONE DIPROPIONATE 50; .5 UG/G; MG/G
SUSPENSION TOPICAL
COMMUNITY
Start: 2023-12-21

## 2024-02-21 RX ORDER — TIRZEPATIDE 5 MG/.5ML
5 INJECTION, SOLUTION SUBCUTANEOUS WEEKLY
Qty: 2 ML | Refills: 2 | Status: SHIPPED | OUTPATIENT
Start: 2024-02-21

## 2024-02-21 RX ORDER — KETOCONAZOLE 20 MG/ML
SHAMPOO TOPICAL
COMMUNITY
Start: 2023-11-01

## 2024-02-21 RX ORDER — CLOBETASOL PROPIONATE 0.46 MG/ML
SOLUTION TOPICAL
COMMUNITY
Start: 2023-10-18

## 2024-02-21 RX ORDER — PANCRELIPASE LIPASE, PANCRELIPASE PROTEASE, PANCRELIPASE AMYLASE 252600; 60000; 189600 [USP'U]/1; [USP'U]/1; [USP'U]/1
1 CAPSULE, DELAYED RELEASE ORAL
Qty: 48 CAPSULE | Refills: 0 | COMMUNITY
Start: 2024-02-21

## 2024-02-21 RX ORDER — FLUOCINOLONE ACETONIDE 0.11 MG/ML
OIL TOPICAL
COMMUNITY
Start: 2023-11-17

## 2024-02-21 RX ORDER — CLOBETASOL PROPIONATE 0.05 G/100ML
SHAMPOO TOPICAL
COMMUNITY
Start: 2023-12-07

## 2024-02-21 NOTE — PROGRESS NOTES
Balbina Ramirez is a 53 year old female presents today for follow-up on medical weight loss program for the treatment of overweight, obesity, or morbid obesity.    S:  Current weight   Wt Readings from Last 6 Encounters:   02/21/24 215 lb (97.5 kg)   02/05/24 212 lb 9.6 oz (96.4 kg)   11/13/23 232 lb (105.2 kg)   12/05/22 200 lb (90.7 kg)   08/03/22 194 lb (88 kg)   03/11/22 184 lb (83.5 kg)    AND BMI Body mass index is 38.7 kg/m²..    Patient has lost 17# since LOV 3 month ago.  She has been compliant with therapy. She has recently started Zepbound 5 mg weekly with some nausea and bloating. Has noticed a reduction in appetite and cravings.    Testing/consult completed since LOV: Dietician: no.  Labs: yes, reviewed in EMR.    Labs: WNL    Exercise: none  Nutrition: 24 hour food log reviewed: protein and produce. Meals out per week on average: 0.   Stress: 6/10  Sleep:  7 hours/night    Social hx and PMH reviewed. 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    REVIEW OF SYSTEMS:  GENERAL: feels well otherwise  LUNGS: denies shortness of breath with exertion  CARDIOVASCULAR: denies chest pain on exertion, denies palpitations or pedal edema  GI: denies abdominal pain.  No V/D/C, see above  MUSCULOSKELETAL: no acute joint or muscle pain  NEURO: denies headaches  PSYCH: denies change in behavior or mood, denies feeling sad or depressed    EXAM:  /64   Pulse 95   Resp 16   Ht 5' 2.5\" (1.588 m)   Wt 215 lb (97.5 kg)   BMI 38.70 kg/m²   GENERAL: well developed, well nourished, in no apparent distress, obese  EYES: conjunctiva pink, sclera non icteric, PERRLA  LUNGS: CTA in all fields, breathing non labored  CARDIO: RRR without murmur, normal S1 and S2 without clicks or gallops, no pedal edema.  GI: +BS, soft, non tender, no masses  NEURO/MS: motor and sensory grossly intact  PSYCH: pleasant, cooperative, normal mood and affect    ASSESSMENT AND PLAN:  Reviewed Initial Weight Data and Goal Weight Loss:        Encounter Diagnoses   Name Primary?    Encounter for therapeutic drug monitoring Yes    Class 3 severe obesity with serious comorbidity and body mass index (BMI) of 40.0 to 44.9 in adult, unspecified obesity type (HCC)     Cirrhosis of liver without ascites, unspecified hepatic cirrhosis type (HCC)     Hypothyroidism, unspecified type     Bloating     Heartburn        No orders of the defined types were placed in this encounter.      Meds & Refills for this Visit:  Requested Prescriptions     Signed Prescriptions Disp Refills    Tirzepatide-Weight Management (ZEPBOUND) 5 MG/0.5ML Subcutaneous Solution Auto-injector 2 mL 2     Sig: Inject 5 mg into the skin once a week.    Pancrelipase, Lip-Prot-Amyl, (ZENPEP) 14570-115894 units Oral Cap DR Particles 48 capsule 0     Sig: Take 1 capsule by mouth 3 (three) times daily with meals.       Imaging & Consults:  None      Plan:  Patient has lost 17# since LOV 3 month ago on Zepbound 5 mg weekly with a total weight loss of 17# since initial consult on 11/13/23 with initial weight of 232#.  Weight loss goal: undetermined amount of weight loss.  CPM, add Zenpep and monitor GI symptoms.  on fitness. See patient instructions below for additional plans and patient counseling.      Patient Instructions   Continue making lifestyle changes that focus on good nutrition, regular exercise and stress management.    Medication Plan: Continue Zepbound at 5 mg weekly. Add Zenpep sample taking 1 capsule with meals to help reduce symptoms of nausea, bloating, heartburn. If successful then send MyChart for prescription.    Next steps to work on before next office visit include:     How to Build a Well-rounded Exercise Plan  January 13, 2023  Posted in Blog, Exercise  By Your Weight Matters Campaign    If you’re building a new exercise plan, there are so many options out there for physical activity. It can easily be overwhelming to choose what your next workout should be, especially  if you aren’t taking a class or following a specific guided program.    However, having a variety of choices actually plays out to your benefit. It can prevent you from getting burned out on just one activity. There are several elements of fitness to consider, and each has different benefits. Cardiovascular exercise, strength-based exercise, endurance exercise, and flexibility all play a pivotal role in your health and fitness. Take your exercise plan one step at a time, knowing a little of each type of activity can go a long way.    Cardiovascular Exercise  Cardiovascular exercise, or aerobic fitness, is any exercise that uses large muscle groups over a long period of time. Typically, when people think of exercise, this is what they think of first.    Benefits: Lower your risk for heart disease, improve your sleep, manage your weight and decrease feelings of depression.  Examples: Walking, running, hiking, biking, dancing, swimming.  How Often: According to the Physical Activity Guidelines for Americans, the recommendation is 150 minutes of activity per week. However, you don’t need to complete this all at once. Exercise can add up quickly during your week if you do it a little at a time. A 20-minute walk at lunch with co-workers, a brisk walk around the soccer field during your child’s practice, or an after-dinner bike ride all add up.    Strength Training  Strength training, or resistance training, involves exercises that are used to build physical strength and to gain and maintain lean muscle mass. As we age, our muscle mass naturally decreases, so strength-training is important to combat or even reverse this decline.    Benefits: Manage your weight, reduce your risk of injury, and do everyday activities with more ease.  Examples: Weight-lifting, using resistance bands, body weight exercises.  How Often: Strength-training takes minimal time. The Department of Health and Human Services recommends at least two  sessions per week working all major muscle groups.    Flexibility  Flexibility is the range of motion you have around a joint. Incorporating flexibility exercises into your routine is just as important as cardiovascular activity or strength-training.    Benefits: Improve your balance and agility, make daily activity easier and reduce your risk for injury. This is even more important with aging.  Examples: Stretching before or after a workout, yoga, Pilates, edmundo chi.  How Often: The American College of Sports Medicine recommends adults engage in flexibility exercises two to three days per week.    Making it All Work  Try not to get overwhelmed! You can make gradual adjustments when changing and adding to your exercise plan. Nothing happens overnight. Start slow and increase the frequency, intensity, duration, etc. over time instead of making major changes all at once.    Take a look at your current level of physical activity to evaluate where you are in your fitness journey. Next, decide where you want to be and determine your goals from there.    If you are an avid walker and would like to build strength, begin by adding five to 10 minutes of strength exercises after each walk.  If you are a biker and feel you need to improve your flexibility, try attending a weekly yoga class or reference an online stretching video.  If you are new to exercise, begin very slowly and increase over time. Start with something as simple as a 10-15 minute walk.  Above all, enjoy the journey of exercise as you begin to improve your overall fitness and reap the health benefits. Exercise is all for YOU -- no one else!    Developing a balanced fitness routine takes time. Begin to build the mentality of fitness 4 function! Start gradually and safely to continue to lose and maintain weight loss, build strength and prevent injury. Fitness not only supports a healthy body, but also a healthy mind with reducing stress and lifting your mood. I  recommend a routine of 3x/week of cardio with varying intensity, 2x/week of strength training and 1x/week of stretching/flexibility/balance- such as Yoga.  Three ingredients necessary for making a habit of exercise for a lifetime includes: convenience, budget friendly and most importantly FUN! Changing up your exercise routine seasonally can keep you motivated and expose you to new interests and challenges! Step outside your comfort zone and give it a try, you never know where the challenge will lead you and what changes you may see!    Learning to Apply the FITT Principle to Your Exercise Plan  One of the biggest challenges with exercise is knowing where to start and how to get better. To improve your fitness, you must self-monitor your workouts and make changes when necessary. One of the best tools you can use to help you is the FITT Principle.  FITT is an acronym that outlines the basic components of a successful exercise plan.  Frequency - How often you exercise  Intensity - How hard you exercise  Time - How long you exercise  Type - What kind of exercise you do  How Often Should You Exercise?  It is a myth that you must work out for extended periods every day to lose weight and keep it off. What is considered an “effective” exercise varies between people. Factors that affect this include age, fitness level, mobility, health conditions, etc.  Before you begin an exercise plan and decide how often to exercise, consider these key factors.  What is your current fitness level? What are you able to do?  What is your schedule? How much time do you have to exercise?  What health and fitness goals do you want to achieve?  Build your plan off of the answers to these questions. If you are a busy mom and you want to lose weight to gain more energy, you might not have a lot of time. Maybe your only form of exercise is keeping up with your kids. In this case, you may want to start small. For example, you could plan workouts  for weekend afternoons when your spouse can watch the kids.  How Hard Should You Exercise?  The best way to see how hard you are working is to monitor your heart rate. You can do this by wearing a fitness tracker, heart rate monitor or smart watch. You can also feel for your heartbeat and count it over a 15-second period.  Low-intensity - An activity level you can continue for a long time (walking)  Moderate-intensity - An activity level that will boost your heart rate and require effort to maintain (biking)  High-intensity - An activity level that feels like an all-out effort. Your heart rate is high and you can't speak complete sentences between breaths  How Long Should You Exercise?  The time you spend exercising will usually depend on what you are doing. Health experts recommend at least 30 minutes of cardio exercise each workout. However, if you are doing a strength-based exercise, you will likely pay more attention to your number of “sets” and “reps.” Regardless, many other factors are involved.  The amount of time you have  How long it takes you to feel fatigued  Weather, time of day  Health conditions  What Should You Do for Exercise?  Should you hit up the elliptical at the gym? Should you go for a hike? The type of exercise you do depends on what you like and what results you want.  For example, if you want to improve your cardio-vascular fitness and you love the outdoors, try exercises like hiking, swimming and biking. If you want to improve your muscle strength and you enjoy the convenience of the gym, try using free weights or machine weights. You can also use your body weight for exercises like push-ups, chin-ups, planks, etc.  The FITT Principle: Final Considerations  You can use the FITT Principle for cardio exercise, strength-based exercise, stretching and more. However, before you start any exercise plan, first consult with your healthcare provider. They will help you develop a plan that is safe  and effective. By using the FITT Principle, you can not only improve your fitness level with time, but you can also prevent serious injury.            Medication use and SEs reviewed with patient.    Return in about 3 months (around 5/21/2024) for weight management via clinic or VV.    Patient verbalizes understanding.    DOCUMENTATION OF TIME SPENT: Code selection for this visit was based on time spent : 25 minutes on date of service in preparing to see the patient, obtaining and/or reviewing separately obtained history, performing a medically appropriate examination, counseling and educating the patient/family/caregiver, ordering medications or testing, referring and communicating with other healthcare providers, documenting clinical information in the electronic medical record, independently interpreting results and communicating results to the patient/family/caregiver and care coordination with the patient's other providers.

## 2024-02-21 NOTE — PATIENT INSTRUCTIONS
Continue making lifestyle changes that focus on good nutrition, regular exercise and stress management.    Medication Plan: Continue Zepbound at 5 mg weekly. Add Zenpep sample taking 1 capsule with meals to help reduce symptoms of nausea, bloating, heartburn. If successful then send MyChart for prescription.    Next steps to work on before next office visit include:     How to Build a Well-rounded Exercise Plan  January 13, 2023  Posted in Blog, Exercise  By Your Weight Matters Campaign    If you’re building a new exercise plan, there are so many options out there for physical activity. It can easily be overwhelming to choose what your next workout should be, especially if you aren’t taking a class or following a specific guided program.    However, having a variety of choices actually plays out to your benefit. It can prevent you from getting burned out on just one activity. There are several elements of fitness to consider, and each has different benefits. Cardiovascular exercise, strength-based exercise, endurance exercise, and flexibility all play a pivotal role in your health and fitness. Take your exercise plan one step at a time, knowing a little of each type of activity can go a long way.    Cardiovascular Exercise  Cardiovascular exercise, or aerobic fitness, is any exercise that uses large muscle groups over a long period of time. Typically, when people think of exercise, this is what they think of first.    Benefits: Lower your risk for heart disease, improve your sleep, manage your weight and decrease feelings of depression.  Examples: Walking, running, hiking, biking, dancing, swimming.  How Often: According to the Physical Activity Guidelines for Americans, the recommendation is 150 minutes of activity per week. However, you don’t need to complete this all at once. Exercise can add up quickly during your week if you do it a little at a time. A 20-minute walk at lunch with co-workers, a brisk walk  around the soccer field during your child’s practice, or an after-dinner bike ride all add up.    Strength Training  Strength training, or resistance training, involves exercises that are used to build physical strength and to gain and maintain lean muscle mass. As we age, our muscle mass naturally decreases, so strength-training is important to combat or even reverse this decline.    Benefits: Manage your weight, reduce your risk of injury, and do everyday activities with more ease.  Examples: Weight-lifting, using resistance bands, body weight exercises.  How Often: Strength-training takes minimal time. The Department of Health and Human Services recommends at least two sessions per week working all major muscle groups.    Flexibility  Flexibility is the range of motion you have around a joint. Incorporating flexibility exercises into your routine is just as important as cardiovascular activity or strength-training.    Benefits: Improve your balance and agility, make daily activity easier and reduce your risk for injury. This is even more important with aging.  Examples: Stretching before or after a workout, yoga, Pilates, edmundo chi.  How Often: The American College of Sports Medicine recommends adults engage in flexibility exercises two to three days per week.    Making it All Work  Try not to get overwhelmed! You can make gradual adjustments when changing and adding to your exercise plan. Nothing happens overnight. Start slow and increase the frequency, intensity, duration, etc. over time instead of making major changes all at once.    Take a look at your current level of physical activity to evaluate where you are in your fitness journey. Next, decide where you want to be and determine your goals from there.    If you are an avid walker and would like to build strength, begin by adding five to 10 minutes of strength exercises after each walk.  If you are a biker and feel you need to improve your flexibility, try  attending a weekly yoga class or reference an online stretching video.  If you are new to exercise, begin very slowly and increase over time. Start with something as simple as a 10-15 minute walk.  Above all, enjoy the journey of exercise as you begin to improve your overall fitness and reap the health benefits. Exercise is all for YOU -- no one else!    Developing a balanced fitness routine takes time. Begin to build the mentality of fitness 4 function! Start gradually and safely to continue to lose and maintain weight loss, build strength and prevent injury. Fitness not only supports a healthy body, but also a healthy mind with reducing stress and lifting your mood. I recommend a routine of 3x/week of cardio with varying intensity, 2x/week of strength training and 1x/week of stretching/flexibility/balance- such as Yoga.  Three ingredients necessary for making a habit of exercise for a lifetime includes: convenience, budget friendly and most importantly FUN! Changing up your exercise routine seasonally can keep you motivated and expose you to new interests and challenges! Step outside your comfort zone and give it a try, you never know where the challenge will lead you and what changes you may see!    Learning to Apply the FITT Principle to Your Exercise Plan  One of the biggest challenges with exercise is knowing where to start and how to get better. To improve your fitness, you must self-monitor your workouts and make changes when necessary. One of the best tools you can use to help you is the FITT Principle.  FITT is an acronym that outlines the basic components of a successful exercise plan.  Frequency - How often you exercise  Intensity - How hard you exercise  Time - How long you exercise  Type - What kind of exercise you do  How Often Should You Exercise?  It is a myth that you must work out for extended periods every day to lose weight and keep it off. What is considered an “effective” exercise varies  between people. Factors that affect this include age, fitness level, mobility, health conditions, etc.  Before you begin an exercise plan and decide how often to exercise, consider these key factors.  What is your current fitness level? What are you able to do?  What is your schedule? How much time do you have to exercise?  What health and fitness goals do you want to achieve?  Build your plan off of the answers to these questions. If you are a busy mom and you want to lose weight to gain more energy, you might not have a lot of time. Maybe your only form of exercise is keeping up with your kids. In this case, you may want to start small. For example, you could plan workouts for weekend afternoons when your spouse can watch the kids.  How Hard Should You Exercise?  The best way to see how hard you are working is to monitor your heart rate. You can do this by wearing a fitness tracker, heart rate monitor or smart watch. You can also feel for your heartbeat and count it over a 15-second period.  Low-intensity - An activity level you can continue for a long time (walking)  Moderate-intensity - An activity level that will boost your heart rate and require effort to maintain (biking)  High-intensity - An activity level that feels like an all-out effort. Your heart rate is high and you can't speak complete sentences between breaths  How Long Should You Exercise?  The time you spend exercising will usually depend on what you are doing. Health experts recommend at least 30 minutes of cardio exercise each workout. However, if you are doing a strength-based exercise, you will likely pay more attention to your number of “sets” and “reps.” Regardless, many other factors are involved.  The amount of time you have  How long it takes you to feel fatigued  Weather, time of day  Health conditions  What Should You Do for Exercise?  Should you hit up the elliptical at the gym? Should you go for a hike? The type of exercise you do  depends on what you like and what results you want.  For example, if you want to improve your cardio-vascular fitness and you love the outdoors, try exercises like hiking, swimming and biking. If you want to improve your muscle strength and you enjoy the convenience of the gym, try using free weights or machine weights. You can also use your body weight for exercises like push-ups, chin-ups, planks, etc.  The FITT Principle: Final Considerations  You can use the FITT Principle for cardio exercise, strength-based exercise, stretching and more. However, before you start any exercise plan, first consult with your healthcare provider. They will help you develop a plan that is safe and effective. By using the FITT Principle, you can not only improve your fitness level with time, but you can also prevent serious injury.

## 2024-02-23 RX ORDER — LEVOTHYROXINE SODIUM 112 UG/1
TABLET ORAL
Qty: 30 TABLET | Refills: 2 | Status: SHIPPED | OUTPATIENT
Start: 2024-02-23

## 2024-02-23 NOTE — TELEPHONE ENCOUNTER
Levothyroxine 112 mcg  Filled 1-31-24  Qty 90  0 refills  Future Appointments   Date Time Provider Department Center   6/13/2024  4:20 PM Paulina Arellano APRN [facility] [address]   8/5/2024  3:30 PM James Coronel MD [facility]   LOV 02-15-22 SM  Labs 1-20-24 [facility] REFLEX

## 2024-05-03 ENCOUNTER — PATIENT MESSAGE (OUTPATIENT)
Dept: INTERNAL MEDICINE CLINIC | Facility: CLINIC | Age: 54
End: 2024-05-03

## 2024-05-03 DIAGNOSIS — E66.01 CLASS 3 SEVERE OBESITY WITH SERIOUS COMORBIDITY AND BODY MASS INDEX (BMI) OF 40.0 TO 44.9 IN ADULT, UNSPECIFIED OBESITY TYPE (HCC): Primary | ICD-10-CM

## 2024-05-03 RX ORDER — TIRZEPATIDE 2.5 MG/.5ML
2.5 INJECTION, SOLUTION SUBCUTANEOUS WEEKLY
Qty: 2 ML | Refills: 0 | Status: SHIPPED | OUTPATIENT
Start: 2024-05-03

## 2024-05-03 NOTE — TELEPHONE ENCOUNTER
From: Balbina Ramirez  To: Paulina Arellano  Sent: 5/3/2024 8:56 AM CDT  Subject: Zepbound 2.5mg     Good Morning,    I have phoned my pharmacy and Zepbound 2.5mg is available. They will need a new script sent to them for the order to be filled. She told me I can get it by Monday if they get the script. I go to the [facility] at [facility] in [place], [place].   Can you please let me know that has been submitted to them and I will follow up with [facility]  Thank you,  Balbina Ramirez

## 2024-05-03 NOTE — TELEPHONE ENCOUNTER
A 3 months RX was sent for Zepbound 5 mg on 2/21/24  Cannot find.  Wants to resume 2.5 mg and send to a different pharmacy    Future Appointments   Date Time Provider Department Center   6/13/2024  4:20 PM Paulina Arellano APRN [facility] [address]

## 2024-05-07 ENCOUNTER — TELEPHONE (OUTPATIENT)
Dept: INTERNAL MEDICINE CLINIC | Facility: CLINIC | Age: 54
End: 2024-05-07

## 2024-07-11 RX ORDER — LEVOTHYROXINE SODIUM 112 UG/1
112 TABLET ORAL
Qty: 90 TABLET | Refills: 0 | Status: SHIPPED | OUTPATIENT
Start: 2024-07-11

## 2024-07-11 NOTE — TELEPHONE ENCOUNTER
Levothyroxine 112 mcg    Thyroid Medication Protocol Rogitn41/10/2024 11:14 PM   Protocol Details In person appointment or virtual visit in the past 12 mos or appointment in next 3 mos      Filled 2-23-24  Qty 30  2 refills  Future Appointments   Date Time Provider Department Center   8/5/2024  3:30 PM James Coronel MD [facility]   LOV 2-15-22

## 2024-08-05 ENCOUNTER — OFFICE VISIT (OUTPATIENT)
Dept: SURGERY | Facility: CLINIC | Age: 54
End: 2024-08-05

## 2024-09-07 NOTE — PROGRESS NOTES
HPI:   Balbina Ramirez is a 54 year old female who presents for a complete physical exam. Symptoms: denies discharge, itching, burning or dysuria, is menopausal. Patient complains of none.   Regular SBE- occ,Sexually active- yes,  Contraception- menopausal.     Pt had a history of hypothyroidism and here to recheck. Has been tolerating the medication well. Due for TSH repeat. Denies any shakiness, palpitations, increased BM's or wgt change. Denies any fatigue, skin or hair issues.    Screening:  Immunization History   Administered Date(s) Administered    Covid-19 Vaccine Pfizer 30 mcg/0.3 ml 03/20/2021, 04/10/2021    FLULAVAL 6 months & older 0.5 ml Prefilled syringe ([id number]) 11/19/2018, 10/15/2020    HEP A/HEP B Combined 08/16/2021, 02/15/2022    HEP B, Adult 09/13/2021    TDAP 05/21/2020   Pended Date(s) Pended    HEP B, Adult 01/20/2022    Hep A, Adult 08/10/2021      Menarche- 9 yr  PAP- 8/3/22  Any abnormal pap smears- yes ASCUS 3/2015  Pregnancies- 1, Live births- 1  Mammogram-  5/6/23   Any breast cancer- mother and sister, or any gynecological cancer- none, any cancers father skin cancer,self skin cancer  Labs- partial 11/22/23  DEXA over 65yr- n/a  Flu shot-  none  Colon- 3/11/22  Glasses/contacts- yes, last exam- ?  Dental visits- 9/2024    Immunization History   Administered Date(s) Administered    Covid-19 Vaccine Pfizer 30 mcg/0.3 ml 03/20/2021, 04/10/2021    FLULAVAL 6 months & older 0.5 ml Prefilled syringe ([id number]) 11/19/2018, 10/15/2020    HEP A/HEP B Combined 08/16/2021, 02/15/2022    HEP B, Adult 09/13/2021    TDAP 05/21/2020   Pended Date(s) Pended    HEP B, Adult 01/20/2022    Hep A, Adult 08/10/2021     Wt Readings from Last 6 Encounters:   09/09/24 226 lb (102.5 kg)   02/21/24 215 lb (97.5 kg)   02/05/24 212 lb 9.6 oz (96.4 kg)   11/13/23 232 lb (105.2 kg)   12/05/22 200 lb (90.7 kg)   08/03/22 194 lb (88 kg)     Body mass index is 40.03 kg/m².     Lab Results   Component Value Date    GLU 90  11/26/2022    GLU 90 03/05/2022     (H) 12/31/2020     Lab Results   Component Value Date    CHOLEST 206 (H) 03/05/2022    CHOLEST 207 (H) 04/24/2021    CHOLEST 230 (H) 12/31/2020     Lab Results   Component Value Date    HDL 76 03/05/2022    HDL 43 (L) 04/24/2021    HDL 56 12/31/2020     Lab Results   Component Value Date     (H) 03/05/2022     (H) 04/24/2021     (H) 12/31/2020     Lab Results   Component Value Date    AST 52 (H) 11/26/2022    AST 29 03/05/2022    AST 22 01/29/2022     Lab Results   Component Value Date    ALT 52 (H) 11/26/2022    ALT 25 03/05/2022    ALT 19 01/29/2022       Current Outpatient Medications   Medication Sig Dispense Refill    levothyroxine 112 MCG Oral Tab Take 1 tablet (112 mcg total) by mouth before breakfast. DUE FOR AN APPOINTMENT AND LAB WORK ASAP! 90 tablet 0    TACLONEX 0.005-0.064 % External Suspension       Clobetasol Propionate 0.05 % External Shampoo       clobetasol 0.05 % External Solution       Fluocinolone Acetonide Body 0.01 % External Oil 1(ONE) APPLICATION(S) TOPICAL 2(TWO) TIMES A DAY      ketoconazole 2 % External Shampoo every 28 days. FOR 21 DAYS IN, THEN REMOVE FOR 7 DAYS      spironolactone 50 MG Oral Tab Take 1 tablet (50 mg total) by mouth daily. 30 tablet 5    Sulfacetamide Sodium-Sulfur 10-5 % External Liquid Apply topically daily.      Cholecalciferol (VITAMIN D) 50 MCG (2000 UT) Oral Cap Take by mouth.      furosemide 40 MG Oral Tab Take 1.5 tablets (60 mg total) by mouth 2 (two) times daily. 90 tablet 11    Pancrelipase, Lip-Prot-Amyl, (ZENPEP) 76347-094989 units Oral Cap DR Particles Take 1 capsule by mouth 3 (three) times daily with meals. (Patient not taking: Reported on 9/9/2024) 48 capsule 0      Past Medical History:    Cancer (HCC)    skin    Disorder of liver    cirrhosis    Disorder of thyroid    HIGH CHOLESTEROL    History of mumps    History of varicella    Human papillomavirus in conditions classified elsewhere  and of unspecified site    when 20    In vitro fertilization    d/t infertility    Myalgia and myositis, unspecified    Rosacea    Rosacea    Unspecified hypothyroidism    Visual impairment    reading glasses      Past Surgical History:   Procedure Laterality Date    Breast biopsy            Colonoscopy N/A 3/11/2022    Procedure: COLONOSCOPY with cold snare polypectomy;  Surgeon: Gatito Johnson MD;  Location:  ENDOSCOPY    Colposcopy, cervix w/upper adjacent vagina; w/biopsy(s), cervix  ,     HPV, nl    Cryocautery of cervix      HPV    D & c      Endometrial ablation      Foot/toes surgery proc unlisted      R bunionectomy    Samina biopsy stereo nodule 1 site left (cpt=19081)  2014    PAULA    Samina biopsy stereo nodule 1 site right (cpt=19081)  3/2014    Samina biopsy stereo nodule 2 site bilat (cpt=19081/44666)  3/2014    Other surgical history      L knee surgery    Tonsillectomy        Family History   Problem Relation Age of Onset    High Cholesterol Mother         hypercholesterolemia    Hypertension Mother     Cancer Mother         Lymphoma, diagnosed in Oct 2012, Non Hodgkins Leukemia    Breast Cancer Mother 69    Thyroid disease Mother         hypothyroidism    Cancer Father         Basal cell skin Ca    Cancer Self 30        basal cell skin cancer    Breast Cancer Sister 47    Cancer Other         No family hx Ovarian/Colon Ca    No Known Problems Brother       Social History:   Social History     Socioeconomic History    Marital status:    Tobacco Use    Smoking status: Former     Types: Cigarettes    Smokeless tobacco: Never    Tobacco comments:     quit year    Vaping Use    Vaping status: Never Used   Substance and Sexual Activity    Alcohol use: Not Currently    Drug use: No    Sexual activity: Yes     Partners: Male     Comment: Ablation   Other Topics Concern    Caffeine Concern Yes     Comment: 2 cups of coffee daily.     Special Diet No    Exercise No      Social Determinants of Health      [facility]    Social Connections    [facility]    Housing Stability     Occ:  [employer] Insurance pre authorizations,etc. . : yes. Children: 1.   Exercise: walking.  Diet:  low sodium, avoids soda, and desserts     REVIEW OF SYSTEMS:   GENERAL: feels well otherwise  SKIN: denies any unusual skin lesions  EYES:denies blurred vision or double vision  HEENT: denies nasal congestion, sinus pain or ST  LUNGS: denies shortness of breath with exertion  CARDIOVASCULAR: denies chest pain on exertion  GI: denies abdominal pain,+GERD, +cirrhosis of liver   : denies dysuria, vaginal discharge or itching  MUSCULOSKELETAL: denies back pain  NEURO: denies headaches  PSYCHE: + depression   HEMATOLOGIC: denies hx of anemia  ENDOCRINE: + thyroid history  ALL/ASTHMA: denies hx of allergy or asthma    EXAM:   /76 (BP Location: Left arm, Patient Position: Sitting, Cuff Size: adult)   Pulse 86   Resp 18   Ht 5' 3\" (1.6 m)   Wt 226 lb (102.5 kg)   SpO2 95%   BMI 40.03 kg/m²   Body mass index is 40.03 kg/m².   GENERAL: well developed, well nourished,in no apparent distress  SKIN: no rashes,no suspicious lesions  HEENT: atraumatic, normocephalic,ears and throat are clear  EYES:PERRLA, EOMI, normal optic disk,conjunctiva are clear  NECK: supple,no adenopathy,no bruits  CHEST: no chest tenderness  BREAST: no dominant or suspicious mass  Right breast- 11 o'clock red raised  pea sized papula and 7 o'clock  red raised nickel sized papula. Non tender, no drg.  LUNGS: clear to auscultation  CARDIO: RRR without murmur  GI: good BS's,no masses, HSM or tenderness  :see HPI  MUSCULOSKELETAL: back is not tender,FROM of the back  EXTREMITIES: no cyanosis, clubbing or edema  NEURO: Oriented times three,cranial nerves are intact,motor and sensory are  grossly intact    ASSESSMENT AND PLAN:   Balbina Ramirez is a 54 year old female who presents for a complete physical exam. . Order put in for mammogram. Pt had a biometric screen done ands will send us the results thru [facility].  Pt' s weight is Body mass index is 40.03 kg/m²., recommended low fat diet and aerobic exercise 30 minutes three times weekly.  The patient indicates understanding of these issues and agrees to the plan.  The patient is asked to return for CPX in one year.  1. Routine general medical examination at a health care facility  The patient is asked to return for CPX in one year.    2. Encounter for screening mammogram for malignant neoplasm of breast    - [facility] 2D+3D SCREENING BILAT (CPT=77067/00758); Future    3. Dermatitis  - Pt to continue to keep the areas clean/dry. If worsening,Pt to f/u.    Encounter Diagnoses   Name Primary?    Routine general medical examination at a health care facility Yes    Encounter for screening mammogram for malignant neoplasm of breast     Dermatitis        No orders of the defined types were placed in this encounter.      Meds & Refills for this Visit:  Requested Prescriptions      No prescriptions requested or ordered in this encounter       Imaging & Consults:  [facility] 2D+3D SCREENING BILAT (CPT=77067/10469)

## 2024-09-09 ENCOUNTER — OFFICE VISIT (OUTPATIENT)
Dept: INTERNAL MEDICINE CLINIC | Facility: CLINIC | Age: 54
End: 2024-09-09
Payer: COMMERCIAL

## 2024-09-09 VITALS
BODY MASS INDEX: 40.04 KG/M2 | HEIGHT: 63 IN | OXYGEN SATURATION: 95 % | SYSTOLIC BLOOD PRESSURE: 134 MMHG | WEIGHT: 226 LBS | HEART RATE: 86 BPM | RESPIRATION RATE: 18 BRPM | DIASTOLIC BLOOD PRESSURE: 76 MMHG

## 2024-09-09 DIAGNOSIS — Z12.31 ENCOUNTER FOR SCREENING MAMMOGRAM FOR MALIGNANT NEOPLASM OF BREAST: ICD-10-CM

## 2024-09-09 DIAGNOSIS — L30.9 DERMATITIS: ICD-10-CM

## 2024-09-09 DIAGNOSIS — Z00.00 ROUTINE GENERAL MEDICAL EXAMINATION AT A HEALTH CARE FACILITY: Primary | ICD-10-CM

## 2024-10-09 RX ORDER — LEVOTHYROXINE SODIUM 112 UG/1
112 TABLET ORAL
Qty: 90 TABLET | Refills: 0 | Status: SHIPPED | OUTPATIENT
Start: 2024-10-09

## 2024-10-09 NOTE — TELEPHONE ENCOUNTER
Levothyroxine 112 mcg  Filled 7-11-24  Qty 90  0 refills  Future Appointments   Date Time Provider Department Center   10/14/2024  3:45 PM James Coronel MD [facility]   10/19/2024  9:20 AM [facility] [place]   LOV 9-9-24

## 2024-10-14 ENCOUNTER — APPOINTMENT (OUTPATIENT)
Dept: SURGERY | Facility: CLINIC | Age: 54
End: 2024-10-14

## 2024-10-19 ENCOUNTER — HOSPITAL ENCOUNTER (OUTPATIENT)
Dept: MAMMOGRAPHY | Age: 54
Discharge: HOME | End: 2024-10-19
Attending: FAMILY MEDICINE
Payer: COMMERCIAL

## 2024-10-19 DIAGNOSIS — Z12.31 ENCOUNTER FOR SCREENING MAMMOGRAM FOR MALIGNANT NEOPLASM OF BREAST: ICD-10-CM

## 2024-10-19 PROCEDURE — 77067 SCR MAMMO BI INCL CAD: CPT | Performed by: FAMILY MEDICINE

## 2024-10-19 PROCEDURE — 77063 BREAST TOMOSYNTHESIS BI: CPT | Performed by: FAMILY MEDICINE

## 2024-12-10 ENCOUNTER — OFFICE VISIT (OUTPATIENT)
Dept: INTERNAL MEDICINE CLINIC | Facility: CLINIC | Age: 54
End: 2024-12-10
Payer: COMMERCIAL

## 2024-12-10 ENCOUNTER — APPOINTMENT (OUTPATIENT)
Dept: GENERAL RADIOLOGY | Facility: HOSPITAL | Age: 54
End: 2024-12-10
Attending: EMERGENCY MEDICINE
Payer: COMMERCIAL

## 2024-12-10 ENCOUNTER — HOSPITAL ENCOUNTER (INPATIENT)
Facility: HOSPITAL | Age: 54
LOS: 1 days | Discharge: HOME | End: 2024-12-11
Attending: EMERGENCY MEDICINE | Admitting: INTERNAL MEDICINE
Payer: COMMERCIAL

## 2024-12-10 VITALS
HEART RATE: 96 BPM | BODY MASS INDEX: 40 KG/M2 | OXYGEN SATURATION: 98 % | DIASTOLIC BLOOD PRESSURE: 76 MMHG | RESPIRATION RATE: 18 BRPM | HEIGHT: 63 IN | SYSTOLIC BLOOD PRESSURE: 126 MMHG

## 2024-12-10 DIAGNOSIS — R60.9 EDEMA, UNSPECIFIED TYPE: ICD-10-CM

## 2024-12-10 DIAGNOSIS — R06.02 SOB (SHORTNESS OF BREATH): ICD-10-CM

## 2024-12-10 DIAGNOSIS — R17 JAUNDICE: ICD-10-CM

## 2024-12-10 DIAGNOSIS — K75.81 NASH (NONALCOHOLIC STEATOHEPATITIS): Primary | ICD-10-CM

## 2024-12-10 DIAGNOSIS — R18.8 ASCITES OF LIVER: ICD-10-CM

## 2024-12-10 DIAGNOSIS — R18.8 OTHER ASCITES: Primary | ICD-10-CM

## 2024-12-10 DIAGNOSIS — J18.9 COMMUNITY ACQUIRED PNEUMONIA, UNSPECIFIED LATERALITY: ICD-10-CM

## 2024-12-10 PROBLEM — E87.6 HYPOKALEMIA: Status: ACTIVE | Noted: 2024-12-10

## 2024-12-10 LAB
ALBUMIN SERPL-MCNC: 3.4 G/DL (ref 3.2–4.8)
ALBUMIN/GLOB SERPL: 1.1 {RATIO} (ref 1–2)
ALP LIVER SERPL-CCNC: 84 U/L
ALT SERPL-CCNC: 18 U/L
ANION GAP SERPL CALC-SCNC: 8 MMOL/L (ref 0–18)
AST SERPL-CCNC: 35 U/L (ref ?–34)
BASOPHILS # BLD AUTO: 0.02 X10(3) UL (ref 0–0.2)
BASOPHILS NFR BLD AUTO: 0.6 %
BILIRUB SERPL-MCNC: 4.5 MG/DL (ref 0.3–1.2)
BUN BLD-MCNC: 6 MG/DL (ref 9–23)
CALCIUM BLD-MCNC: 8.9 MG/DL (ref 8.7–10.4)
CHLORIDE SERPL-SCNC: 104 MMOL/L (ref 98–112)
CO2 SERPL-SCNC: 24 MMOL/L (ref 21–32)
CREAT BLD-MCNC: 0.61 MG/DL
EGFRCR SERPLBLD CKD-EPI 2021: 105 ML/MIN/1.73M2 (ref 60–?)
EOSINOPHIL # BLD AUTO: 0.06 X10(3) UL (ref 0–0.7)
EOSINOPHIL NFR BLD AUTO: 1.8 %
ERYTHROCYTE [DISTWIDTH] IN BLOOD BY AUTOMATED COUNT: 13.2 %
FLUAV + FLUBV RNA SPEC NAA+PROBE: NEGATIVE
FLUAV + FLUBV RNA SPEC NAA+PROBE: NEGATIVE
GLOBULIN PLAS-MCNC: 3.2 G/DL (ref 2–3.5)
GLUCOSE BLD-MCNC: 105 MG/DL (ref 70–99)
HCT VFR BLD AUTO: 43.1 %
HGB BLD-MCNC: 15 G/DL
IMM GRANULOCYTES # BLD AUTO: 0.01 X10(3) UL (ref 0–1)
IMM GRANULOCYTES NFR BLD: 0.3 %
INR BLD: 1.67 (ref 0.8–1.2)
LIPASE SERPL-CCNC: 33 U/L (ref 12–53)
LYMPHOCYTES # BLD AUTO: 0.88 X10(3) UL (ref 1–4)
LYMPHOCYTES NFR BLD AUTO: 26.2 %
MCH RBC QN AUTO: 33.1 PG (ref 26–34)
MCHC RBC AUTO-ENTMCNC: 34.8 G/DL (ref 31–37)
MCV RBC AUTO: 95.1 FL
MONOCYTES # BLD AUTO: 0.42 X10(3) UL (ref 0.1–1)
MONOCYTES NFR BLD AUTO: 12.5 %
NEUTROPHILS # BLD AUTO: 1.97 X10 (3) UL (ref 1.5–7.7)
NEUTROPHILS # BLD AUTO: 1.97 X10(3) UL (ref 1.5–7.7)
NEUTROPHILS NFR BLD AUTO: 58.6 %
OSMOLALITY SERPL CALC.SUM OF ELEC: 280 MOSM/KG (ref 275–295)
PLATELET # BLD AUTO: 83 10(3)UL (ref 150–450)
PLATELETS.RETICULATED NFR BLD AUTO: 4.8 % (ref 0–7)
POTASSIUM SERPL-SCNC: 3.4 MMOL/L (ref 3.5–5.1)
PROT SERPL-MCNC: 6.6 G/DL (ref 5.7–8.2)
PROTHROMBIN TIME: 19.8 SECONDS (ref 11.6–14.8)
RBC # BLD AUTO: 4.53 X10(6)UL
RSV RNA SPEC NAA+PROBE: NEGATIVE
SARS-COV-2 RNA RESP QL NAA+PROBE: NOT DETECTED
SODIUM SERPL-SCNC: 136 MMOL/L (ref 136–145)
WBC # BLD AUTO: 3.4 X10(3) UL (ref 4–11)

## 2024-12-10 PROCEDURE — 99223 1ST HOSP IP/OBS HIGH 75: CPT | Performed by: STUDENT IN AN ORGANIZED HEALTH CARE EDUCATION/TRAINING PROGRAM

## 2024-12-10 PROCEDURE — 3078F DIAST BP <80 MM HG: CPT | Performed by: STUDENT IN AN ORGANIZED HEALTH CARE EDUCATION/TRAINING PROGRAM

## 2024-12-10 PROCEDURE — 3074F SYST BP LT 130 MM HG: CPT | Performed by: STUDENT IN AN ORGANIZED HEALTH CARE EDUCATION/TRAINING PROGRAM

## 2024-12-10 PROCEDURE — 3074F SYST BP LT 130 MM HG: CPT | Performed by: FAMILY MEDICINE

## 2024-12-10 PROCEDURE — 99214 OFFICE O/P EST MOD 30 MIN: CPT | Performed by: FAMILY MEDICINE

## 2024-12-10 PROCEDURE — 71046 X-RAY EXAM CHEST 2 VIEWS: CPT | Performed by: EMERGENCY MEDICINE

## 2024-12-10 PROCEDURE — 3008F BODY MASS INDEX DOCD: CPT | Performed by: FAMILY MEDICINE

## 2024-12-10 PROCEDURE — 3078F DIAST BP <80 MM HG: CPT | Performed by: FAMILY MEDICINE

## 2024-12-10 RX ORDER — BENZONATATE 100 MG/1
100 CAPSULE ORAL ONCE
Status: COMPLETED | OUTPATIENT
Start: 2024-12-10 | End: 2024-12-10

## 2024-12-10 RX ORDER — FLUOCINOLONE ACETONIDE 0.11 MG/ML
OIL TOPICAL
COMMUNITY
Start: 2024-09-13

## 2024-12-10 NOTE — PROGRESS NOTES
CHIEF COMPLAINT:     Chief Complaint   Patient presents with    Edema     States has swelling in stomach and both legs. States see Dr. Brower states cannot make appt for tomorrow, was told to come in today. States has been about 1-2 months has got worse.     Cough     Cough and SOB. Saw allergist and not much allergies except to dust mites.        HPI:   Balbina Ramirez is a 54 year old female .  Patient has a hx of LOPEZ cirrhosis  and has been seeing Dr. Coronel at [facility]. About 1 1/2 months ago noticed the swelling in the abdomen starting. Pt spoke with their office and they upped her diuretics but they are not helping. Pt states her abdomen is making breathing hard, also sitting /sleeping uncomfortable. Patient has also noticed her legs,ankles and feet are swollen too. Pt can hardly put on her shoes. Pt was suppose to see Dr. Coronel in [place] tomorrow but unable to go. They told her to come here and be assessed.   Pt has been having a cough and SOB. Pt not sure if related to what is going on or not. It has been hard for her to breath but feels it is related to the abdomen being enlarged.Pt did see the allergist recently and is only allergic to pollen and dust mites. Pt has an appt to see pulmonary next week.  No current outpatient medications on file.      Past Medical History:    Cancer (HCC)    skin    Disorder of liver    cirrhosis    Disorder of thyroid    HIGH CHOLESTEROL    History of mumps    History of varicella    Human papillomavirus in conditions classified elsewhere and of unspecified site    when 20    In vitro fertilization    d/t infertility    Myalgia and myositis, unspecified    Rosacea    Rosacea    Unspecified hypothyroidism    Visual impairment    reading glasses      Social History:  Social History     Socioeconomic History    Marital status:    Tobacco Use    Smoking status: Former     Types: Cigarettes    Smokeless tobacco: Never    Tobacco comments:     quit year 2005   Vaping  Use    Vaping status: Never Used   Substance and Sexual Activity    Alcohol use: Not Currently    Drug use: No    Sexual activity: Yes     Partners: Male     Comment: Ablation   Other Topics Concern    Caffeine Concern Yes     Comment: 2 cups of coffee daily.     Special Diet No    Exercise No     Social Drivers of Health     Food Insecurity: No Food Insecurity (12/11/2024)    Food Insecurity     Food Insecurity: Never true   Transportation Needs: No Transportation Needs (12/11/2024)    Transportation Needs     Lack of Transportation: No    [facility]    Social Connections   Housing Stability: Low Risk  (12/11/2024)    Housing Stability     Housing Instability: No        REVIEW OF SYSTEMS:   GENERAL: Denies fever, chills,weight change, decreased appetite  SKIN: Denies rashes, skin wounds or ulcers.  EYES: Denies blurred vision or double vision  HENT: Denies congestion, rhinorrhea, sore throat or ear pain  CHEST: Denies chest pain, or palpitations  LUNGS: see HPI  GI: see HPI  MUSCULOSKELETAL: no arthralgia or swollen joints  LYMPH:  Denies lymphadenopathy  NEURO: Denies headaches or lightheadedness      EXAM:   /76 (BP Location: Left arm, Patient Position: Sitting, Cuff Size: adult)   Pulse 96   Resp 18   Ht 5' 3\" (1.6 m)   SpO2 98%   BMI 40.03 kg/m²   GENERAL: well developed, well nourished,in no apparent distress  SKIN: no rashes,no suspicious lesions  HEAD: atraumatic, normocephalic  EENT: Eyes- Conjunctiva  yellowish. Ears and nose -clear, throat- PND  NECK: supple, non-tender  LUNGS: clear to auscultation bilaterally but decreased breath sounds on the left.  no wheezes or rhonchi. Breathing is non labored currently.  CARDIO: RRR without murmur  GI: abdomen swollen, large and hard-ascites. Dr. Denton consulted and examined the Pt. Pt informed to go to the ER.   EXTREMITIES:+2 edema      Physical Exam    ASSESSMENT AND PLAN:     Encounter  Diagnoses   Name Primary?    LOPEZ (nonalcoholic steatohepatitis) Yes    Ascites of liver     Jaundice     SOB (shortness of breath)        No orders of the defined types were placed in this encounter.      Meds & Refills for this Visit:  Requested Prescriptions      No prescriptions requested or ordered in this encounter       Imaging & Consults:  None    PLAN:  Pt referred to the ER for further care and assessment   The patient indicates understanding of these issues and agrees to the plan.      I spent 30 minutes preparing to see the patient,reviewing patient's chart,results,history,medical decision making,placing orders,counseling/coordinating care and documenting in the chart.

## 2024-12-10 NOTE — ED INITIAL ASSESSMENT (HPI)
Pt was hx cirrhosis and abdomen started becoming distended over the last month and a half and today noticed very distended/hard and no appetite and increased weight gain. Per PCP thinking needs paracentesis.

## 2024-12-11 ENCOUNTER — APPOINTMENT (OUTPATIENT)
Dept: ULTRASOUND IMAGING | Facility: HOSPITAL | Age: 54
End: 2024-12-11
Attending: STUDENT IN AN ORGANIZED HEALTH CARE EDUCATION/TRAINING PROGRAM
Payer: COMMERCIAL

## 2024-12-11 VITALS
RESPIRATION RATE: 18 BRPM | HEART RATE: 84 BPM | TEMPERATURE: 99 F | DIASTOLIC BLOOD PRESSURE: 54 MMHG | HEIGHT: 62 IN | SYSTOLIC BLOOD PRESSURE: 109 MMHG | BODY MASS INDEX: 41 KG/M2 | OXYGEN SATURATION: 96 %

## 2024-12-11 PROBLEM — R60.9 EDEMA, UNSPECIFIED TYPE: Status: ACTIVE | Noted: 2024-12-11

## 2024-12-11 LAB
BASOPHILS NFR PRT: 0 %
COLOR FLD: YELLOW
EOSINOPHIL NFR PRT: 0 %
INR BLD: 1.75 (ref 0.8–1.2)
LACTATE SERPL-SCNC: 1.2 MMOL/L (ref 0.5–2)
LYMPHOCYTES NFR PRT: 75 %
MAGNESIUM SERPL-MCNC: 1.9 MG/DL (ref 1.6–2.6)
MESOTHL CELL NFR PRT: 3 %
MONOS+MACROS NFR PRT: 17 %
NEUTROPHILS PERITONEAL FLUID: 5 %
PROTHROMBIN TIME: 20.6 SECONDS (ref 11.6–14.8)
RBC PERITONEAL FLUID: <3000 /MM3
TOTAL CELLS COUNTED FLD: 100
WBC # PRT: 457 /MM3

## 2024-12-11 PROCEDURE — 49083 ABD PARACENTESIS W/IMAGING: CPT | Performed by: STUDENT IN AN ORGANIZED HEALTH CARE EDUCATION/TRAINING PROGRAM

## 2024-12-11 PROCEDURE — 0W9G3ZZ DRAINAGE OF PERITONEAL CAVITY, PERCUTANEOUS APPROACH: ICD-10-PCS | Performed by: RADIOLOGY

## 2024-12-11 RX ORDER — SENNOSIDES 8.6 MG
17.2 TABLET ORAL NIGHTLY PRN
Status: DISCONTINUED | OUTPATIENT
Start: 2024-12-11 | End: 2024-12-11

## 2024-12-11 RX ORDER — CEFUROXIME AXETIL 500 MG/1
500 TABLET ORAL 2 TIMES DAILY
Qty: 8 TABLET | Refills: 0 | Status: SHIPPED | OUTPATIENT
Start: 2024-12-11 | End: 2024-12-15

## 2024-12-11 RX ORDER — ONDANSETRON 2 MG/ML
4 INJECTION INTRAMUSCULAR; INTRAVENOUS EVERY 6 HOURS PRN
Status: DISCONTINUED | OUTPATIENT
Start: 2024-12-11 | End: 2024-12-11

## 2024-12-11 RX ORDER — ALBUTEROL SULFATE 0.83 MG/ML
2.5 SOLUTION RESPIRATORY (INHALATION) EVERY 4 HOURS PRN
Status: DISCONTINUED | OUTPATIENT
Start: 2024-12-11 | End: 2024-12-11

## 2024-12-11 RX ORDER — FUROSEMIDE 20 MG/1
60 TABLET ORAL DAILY
Status: DISCONTINUED | OUTPATIENT
Start: 2024-12-12 | End: 2024-12-11

## 2024-12-11 RX ORDER — POLYETHYLENE GLYCOL 3350 17 G/17G
17 POWDER, FOR SOLUTION ORAL DAILY PRN
Status: DISCONTINUED | OUTPATIENT
Start: 2024-12-11 | End: 2024-12-11

## 2024-12-11 RX ORDER — ACETAMINOPHEN 500 MG
500 TABLET ORAL EVERY 4 HOURS PRN
Status: DISCONTINUED | OUTPATIENT
Start: 2024-12-11 | End: 2024-12-11

## 2024-12-11 RX ORDER — BENZONATATE 200 MG/1
200 CAPSULE ORAL 3 TIMES DAILY PRN
Status: DISCONTINUED | OUTPATIENT
Start: 2024-12-11 | End: 2024-12-11

## 2024-12-11 RX ORDER — LEVOTHYROXINE SODIUM 112 UG/1
112 TABLET ORAL
Status: DISCONTINUED | OUTPATIENT
Start: 2024-12-12 | End: 2024-12-11

## 2024-12-11 RX ORDER — SODIUM PHOSPHATE, DIBASIC AND SODIUM PHOSPHATE, MONOBASIC 7; 19 G/230ML; G/230ML
1 ENEMA RECTAL ONCE AS NEEDED
Status: DISCONTINUED | OUTPATIENT
Start: 2024-12-11 | End: 2024-12-11

## 2024-12-11 RX ORDER — PROCHLORPERAZINE EDISYLATE 5 MG/ML
5 INJECTION INTRAMUSCULAR; INTRAVENOUS EVERY 8 HOURS PRN
Status: DISCONTINUED | OUTPATIENT
Start: 2024-12-11 | End: 2024-12-11

## 2024-12-11 RX ORDER — ENOXAPARIN SODIUM 100 MG/ML
40 INJECTION SUBCUTANEOUS DAILY
Status: DISCONTINUED | OUTPATIENT
Start: 2024-12-11 | End: 2024-12-11

## 2024-12-11 RX ORDER — BISACODYL 10 MG
10 SUPPOSITORY, RECTAL RECTAL
Status: DISCONTINUED | OUTPATIENT
Start: 2024-12-11 | End: 2024-12-11

## 2024-12-11 RX ORDER — ECHINACEA PURPUREA EXTRACT 125 MG
1 TABLET ORAL
Status: DISCONTINUED | OUTPATIENT
Start: 2024-12-11 | End: 2024-12-11

## 2024-12-11 RX ORDER — ENOXAPARIN SODIUM 100 MG/ML
40 INJECTION SUBCUTANEOUS NIGHTLY
Status: DISCONTINUED | OUTPATIENT
Start: 2024-12-11 | End: 2024-12-11

## 2024-12-11 RX ORDER — SPIRONOLACTONE 100 MG/1
100 TABLET, FILM COATED ORAL DAILY
Status: DISCONTINUED | OUTPATIENT
Start: 2024-12-12 | End: 2024-12-11

## 2024-12-11 NOTE — ED QUICK NOTES
Orders for admission, patient is aware of plan and ready to go upstairs. Any questions, please call ED RN Tony at extension [phone].     Patient Covid vaccination status: Fully vaccinated     COVID Test Ordered in ED: SARS-CoV-2/Flu A and B/RSV by PCR (GeneXpert)    COVID Suspicion at Admission: N/A    Running Infusions:  None    Mental Status/LOC at time of transport: A/Ox4    Other pertinent information:   CIWA score: N/A   NIH score:  N/A

## 2024-12-11 NOTE — PROGRESS NOTES
NURSING ADMISSION NOTE      Patient admitted via Cart  Oriented to room.  Safety precautions initiated.  Bed in low position.  Call light in reach.    AO x4. RA. No tele. Lovenox. Continent. Up standby to bathroom. Rocephin. Azithro. Rounded, distended abdomen. No pain reported. Edema to BLE and both feet. IV SL. Sodium restricted diet 2G. Plan for paracentesis in AM. Pt resting in bed with call light in reach. Navigator completed. Med rec done.

## 2024-12-11 NOTE — PROCEDURES
[facility]  Pre-Procedure Note    Name: Balbina Ramirez  MRN#: [id number]  : 1970    Procedure:  Ultrasound Guided Paracentesis    Indication: Ascites LOPEZ cirrhosis    Allergies:    Allergies[1]    Pertinent Medications:    Is patient on any Aspirin, Coumadin, or any other Anticoagulations/Antiplatelet medications?  no      Mental Status:  Alert and Oriented      Health Status: Acceptable for Procedure    Impression and Plans:    Symptomatic ascites for ultrasound guided paracentesis.    I have reviewed the above information prior to procedure.    I have discussed the risks and benefits and alternatives with the patient.  The patient understands and agrees to proceed with plan of care.    Joel Bahena MD           [1]   Allergies  Allergen Reactions    Neosporin [Neomycin-Bacitracin-Polymyxin] SWELLING     Ointment    Augmentin [Amoxicillin-Pot Clavulanate] RASH

## 2024-12-11 NOTE — ED PROVIDER NOTES
Patient Seen in: [facility] 0sw-a      History     Chief Complaint   Patient presents with    Abdominal Pain     Stated Complaint: PCP sending patient today for 'swollen abd' for months    Subjective:   HPI      This is a 54-year-old woman history of cirrhosis secondary to [place], here for evaluation of diffuse abdominal swelling, swelling of bilateral lower extremities.  Has been sleeping in a chair secondary to discomfort reports diffuse body cramping also reports a persistent cough over the past month no recent fevers vomiting diarrhea chest pain, urinary symptoms any other complaints or concerns.    Objective:     Past Medical History:    Cancer (HCC)    skin    Disorder of liver    cirrhosis    Disorder of thyroid    HIGH CHOLESTEROL    History of mumps    History of varicella    Human papillomavirus in conditions classified elsewhere and of unspecified site    when 20    In vitro fertilization    d/t infertility    Myalgia and myositis, unspecified    Rosacea    Rosacea    Unspecified hypothyroidism    Visual impairment    reading glasses              Past Surgical History:   Procedure Laterality Date    Breast biopsy            Colonoscopy N/A 3/11/2022    Procedure: COLONOSCOPY with cold snare polypectomy;  Surgeon: Gatito Johnson MD;  Location:  ENDOSCOPY    Colposcopy, cervix w/upper adjacent vagina; w/biopsy(s), cervix  ,     HPV, nl    Cryocautery of cervix      HPV    D & c      Endometrial ablation      Foot/toes surgery proc unlisted      R bunionectomy    Samina biopsy stereo nodule 1 site left (cpt=19081)  2014    PAULA    Samina biopsy stereo nodule 1 site right (cpt=19081)  3/2014    Samina biopsy stereo nodule 2 site bilat (cpt=19081/05830)  3/2014    Other surgical history      L knee surgery    Tonsillectomy                  Social History     Socioeconomic History    Marital status:    Tobacco Use    Smoking status: Former     Types: Cigarettes     Smokeless tobacco: Never    Tobacco comments:     quit year 2005   Vaping Use    Vaping status: Never Used   Substance and Sexual Activity    Alcohol use: Not Currently    Drug use: No    Sexual activity: Yes     Partners: Male     Comment: Ablation   Other Topics Concern    Caffeine Concern Yes     Comment: 2 cups of coffee daily.     Special Diet No    Exercise No     Social Drivers of Health     Food Insecurity: No Food Insecurity (12/11/2024)    Food Insecurity     Food Insecurity: Never true   Transportation Needs: No Transportation Needs (12/11/2024)    Transportation Needs     Lack of Transportation: No    [facility]    Social Connections   Housing Stability: Low Risk  (12/11/2024)    Housing Stability     Housing Instability: No                  Physical Exam     ED Triage Vitals [12/10/24 1705]   /81   Pulse 102   Resp 18   Temp 98.7 °F (37.1 °C)   Temp src Temporal   SpO2 94 %   O2 Device None (Room air)       Current Vitals:   Vital Signs  BP: 136/72  Pulse: 93  Resp: 20  Temp: 98.2 °F (36.8 °C)  Temp src: Oral  MAP (mmHg): 89    Oxygen Therapy  SpO2: 95 %  O2 Device: None (Room air)  O2 Flow Rate (L/min): 0 L/min  Pulse Oximetry Type: Continuous  Oximetry Probe Site Changed: Yes  Pulse Ox Probe Location: Right hand        Physical Exam      Physical Exam  Vitals signs and nursing note reviewed.   General:  Patient laying supine in the bed in no acute distress  Head: Normocephalic and atraumatic.   HEENT:  Mucous membranes are moist.   Cardiovascular:  Normal rate and regular rhythm.  Marked edema to the bilateral lower extremities  Pulmonary:  Pulmonary effort is normal.  Normal breath sounds. No wheezing, rhonchi or rales.   Abdominal: Slightly distended, no focal tenderness no rebound tenderness.  Skin: Warm and dry  Neurological: Awake alert, speech is normal        ED Course     Labs Reviewed   COMP METABOLIC PANEL (14) -  Abnormal; Notable for the following components:       Result Value    Glucose 105 (*)     Potassium 3.4 (*)     BUN 6 (*)     AST 35 (*)     Bilirubin, Total 4.5 (*)     All other components within normal limits   CBC WITH DIFFERENTIAL WITH PLATELET - Abnormal; Notable for the following components:    WBC 3.4 (*)     PLT 83.0 (*)     Lymphocyte Absolute 0.88 (*)     All other components within normal limits   PROTHROMBIN TIME (PT) - Abnormal; Notable for the following components:    PT 19.8 (*)     INR 1.67 (*)     All other components within normal limits   LIPASE - Normal   MAGNESIUM - Normal   SARS-COV-2/FLU A AND B/RSV BY PCR (GENEXPERT) - Normal    Narrative:     This test is intended for the qualitative detection and differentiation of SARS-CoV-2, influenza A, influenza B, and respiratory syncytial virus (RSV) viral RNA in nasopharyngeal or nares swabs from individuals suspected of respiratory viral infection consistent with COVID-19 by their healthcare provider. Signs and symptoms of respiratory viral infection due to SARS-CoV-2, influenza, and RSV can be similar.    Test performed using the Xpert Xpress SARS-CoV-2/FLU/RSV (real time RT-PCR)  assay on the [employer] instrument, [address], [place] [address].   This test is being used under the Food and Drug Administration's Emergency Use Authorization.    The authorized Fact Sheet for Healthcare Providers for this assay is available upon request from the laboratory.   PROTHROMBIN TIME (PT)   CELL COUNT PERITONEAL FLUID   LACTIC ACID, PLASMA   RAINBOW DRAW BLUE   BODY FLUID CULT,AEROBIC AND ANAEROBIC   SPUTUM CULTURE   BLOOD CULTURE   BLOOD CULTURE          XR CHEST PA + LAT CHEST (CPT=71046)    Result Date: 12/10/2024  PROCEDURE:  XR CHEST PA + LAT CHEST (CPT=71046)  INDICATIONS:  Cough for 2 days  COMPARISON:  None.  TECHNIQUE:  PA and lateral chest radiographs were obtained.  PATIENT STATED HISTORY: (As transcribed by Technologist)  Patient with a cough for 2  days.    FINDINGS:  LUNGS:  There is small focus of airspace disease in the lingula lobe may represent pneumonia.  CARDIAC:  Normal size cardiac silhouette. MEDIASTINUM:  Normal. PLEURA:  There is a small left pleural effusion. BONES:  Normal for age.            CONCLUSION:  Lingular pneumonia with small left pleural effusion.   LOCATION:  [place]   Dictated by (CST): Aj Galicia MD on 12/10/2024 at 9:46 PM     Finalized by (CST): Aj Galicia MD on 12/10/2024 at 9:48 PM             MDM    This is a 54-year-old woman history of cirrhosis secondary to [place], here for evaluation tight abdominal distention edema in the legs also reports cough congestion for the past month.  Differential includes worsening ascites, SBP, community-acquired pneumonia, bronchitis.  No rebound tenderness on exam no focal abdominal tenderness.  Believe symptoms likely secondary worsening ascites, will admit for therapeutic paracentesis.  Patient does report continued cough appears to have lingular pneumonia for which she was covered with ceftriaxone azithromycin.  Case endorsed to the [place] hospitalist agrees with plan for admission.  Patient is hemodynamically stable.        I independently viewed and interpreted the following imaging: Chest x-ray shows left-sided pneumonia        Medical Decision Making      Disposition and Plan     Clinical Impression:  1. Other ascites    2. Edema, unspecified type    3. Community acquired pneumonia, unspecified laterality         Disposition:  There is no disposition on file for this visit.  There is no disposition time on file for this visit.    Follow-up:  No follow-up provider specified.        Medications Prescribed:  Current Discharge Medication List              Supplementary Documentation:

## 2024-12-11 NOTE — PROCEDURES
[facility]    Balbina Ramirez Patient Status:  Inpatient    1970 MRN [id number]   Location [facility] 0SW-A Attending Soy Armenta, DO   Hosp Day # 0 PCP Dakota Bloom MD         Brief Procedure Report    Pre-Operative Diagnosis: Symptomatic Ascites    Post-Operative Diagnosis: Same as above.    Procedure Performed: Ultrasound guided paracentesis    Anesthesia: 1% lidocaine    EBL: 0    Complications: None    Summary of Case: 3200cc of yellow colored fluid aspirated from the RLQ using a 5 Fr. centesis catheter.  Fluid was sent for routine studies as directed by the ordering physician. Patient tolerated procedure well without immediate complication. Full report to follow in PACS.    Joel Bahena

## 2024-12-11 NOTE — PAYOR COMM NOTE
--------------  ADMISSION REVIEW     Payor: [employer]  Subscriber #:  [id number]  Authorization Number: [id number]    Admit date: 12/11/24  Admit time:  2:09 AM       REVIEW DOCUMENTATION:     ED Provider Notes        ED Provider Notes signed by Antonio Ocasio MD at 12/11/2024  3:49 AM        Patient Seen in: [facility] 0sw-a      History     Chief Complaint   Patient presents with    Abdominal Pain     Stated Complaint: PCP sending patient today for 'swollen abd' for months    Subjective:   HPI      This is a 54-year-old woman history of cirrhosis secondary to [place], here for evaluation of diffuse abdominal swelling, swelling of bilateral lower extremities.  Has been sleeping in a chair secondary to discomfort reports diffuse body cramping also reports a persistent cough over the past month no recent fevers vomiting diarrhea chest pain, urinary symptoms any other complaints or concerns.    Objective:     Past Medical History:    Cancer (HCC)    skin    Disorder of liver    cirrhosis    Disorder of thyroid    HIGH CHOLESTEROL    History of mumps    History of varicella    Human papillomavirus in conditions classified elsewhere and of unspecified site    when 20    In vitro fertilization    d/t infertility    Myalgia and myositis, unspecified    Rosacea    Rosacea    Unspecified hypothyroidism    Visual impairment    reading glasses         Physical Exam     ED Triage Vitals [12/10/24 1705]   /81   Pulse 102   Resp 18   Temp 98.7 °F (37.1 °C)   Temp src Temporal   SpO2 94 %   O2 Device None (Room air)       Current Vitals:   Vital Signs  BP: 136/72  Pulse: 93  Resp: 20  Temp: 98.2 °F (36.8 °C)  Temp src: Oral  MAP (mmHg): 89    Oxygen Therapy  SpO2: 95 %  O2 Device: None (Room air)  O2 Flow Rate (L/min): 0 L/min  Pulse Oximetry Type: Continuous  Oximetry Probe Site Changed: Yes  Pulse Ox Probe Location: Right hand        Physical Exam      Physical Exam  Vitals signs and nursing note  reviewed.   General:  Patient laying supine in the bed in no acute distress  Head: Normocephalic and atraumatic.   HEENT:  Mucous membranes are moist.   Cardiovascular:  Normal rate and regular rhythm.  Marked edema to the bilateral lower extremities  Pulmonary:  Pulmonary effort is normal.  Normal breath sounds. No wheezing, rhonchi or rales.   Abdominal: Slightly distended, no focal tenderness no rebound tenderness.  Skin: Warm and dry  Neurological: Awake alert, speech is normal        ED Course     Labs Reviewed   COMP METABOLIC PANEL (14) - Abnormal; Notable for the following components:       Result Value    Glucose 105 (*)     Potassium 3.4 (*)     BUN 6 (*)     AST 35 (*)     Bilirubin, Total 4.5 (*)     All other components within normal limits   CBC WITH DIFFERENTIAL WITH PLATELET - Abnormal; Notable for the following components:    WBC 3.4 (*)     PLT 83.0 (*)     Lymphocyte Absolute 0.88 (*)     All other components within normal limits   PROTHROMBIN TIME (PT) - Abnormal; Notable for the following components:    PT 19.8 (*)     INR 1.67 (*)     All other components within normal limits   LIPASE - Normal   MAGNESIUM - Normal   SARS-COV-2/FLU A AND B/RSV BY PCR (GENEXPERT) - Normal    Narrative:     This test is intended for the qualitative detection and differentiation of SARS-CoV-2, influenza A, influenza B, and respiratory syncytial virus (RSV) viral RNA in nasopharyngeal or nares swabs from individuals suspected of respiratory viral infection consistent with COVID-19 by their healthcare provider. Signs and symptoms of respiratory viral infection due to SARS-CoV-2, influenza, and RSV can be similar.    Test performed using the Xpert Xpress SARS-CoV-2/FLU/RSV (real time RT-PCR)  assay on the [employer] instrument, [employer], [place] [address].   This test is being used under the Food and Drug Administration's Emergency Use Authorization.    The authorized Fact Sheet for Healthcare Providers for this assay  is available upon request from the laboratory.   PROTHROMBIN TIME (PT)   CELL COUNT PERITONEAL FLUID   LACTIC ACID, PLASMA   RAINBOW DRAW BLUE   BODY FLUID CULT,AEROBIC AND ANAEROBIC   SPUTUM CULTURE   BLOOD CULTURE   BLOOD CULTURE          XR CHEST PA + LAT CHEST (CPT=71046)    Result Date: 12/10/2024  PROCEDURE:  XR CHEST PA + LAT CHEST (CPT=71046)  INDICATIONS:  Cough for 2 days  COMPARISON:  None.  TECHNIQUE:  PA and lateral chest radiographs were obtained.  PATIENT STATED HISTORY: (As transcribed by Technologist)  Patient with a cough for 2 days.    FINDINGS:  LUNGS:  There is small focus of airspace disease in the lingula lobe may represent pneumonia.  CARDIAC:  Normal size cardiac silhouette. MEDIASTINUM:  Normal. PLEURA:  There is a small left pleural effusion. BONES:  Normal for age.            CONCLUSION:  Lingular pneumonia with small left pleural effusion.   LOCATION:  [facility]   Dictated by (CST): Aj Galicia MD on 12/10/2024 at 9:46 PM     Finalized by (CST): Aj Galicia MD on 12/10/2024 at 9:48 PM            MDM    This is a 54-year-old woman history of cirrhosis secondary to [place], here for evaluation tight abdominal distention edema in the legs also reports cough congestion for the past month.  Differential includes worsening ascites, SBP, community-acquired pneumonia, bronchitis.  No rebound tenderness on exam no focal abdominal tenderness.  Believe symptoms likely secondary worsening ascites, will admit for therapeutic paracentesis.  Patient does report continued cough appears to have lingular pneumonia for which she was covered with ceftriaxone azithromycin.  Case endorsed to the [facility] hospitalist agrees with plan for admission.  Patient is hemodynamically stable.        I independently viewed and interpreted the following imaging: Chest x-ray shows left-sided pneumonia        Medical Decision Making      Disposition and Plan     Clinical Impression:  1. Other ascites    2. Edema, unspecified  type    3. Community acquired pneumonia, unspecified laterality          Signed by Antonio Ocasio MD on 12/11/2024  3:49 AM         History and Physical    H&P signed by Rika Espino DO at 12/11/2024  7:14 AM                Chief Complaint: abdominal swelling     Subjective:    History of Present Illness:      Balbina Ramirez is a 54 year old female with PMHx LOPEZ cirrhosis/ HLD/ hypothyroidism/ GERD who presented to the hospital for abdominal swelling. She noticed increased swelling over the past week in her abdomen to the point she could n ot fit into some of her clothes. She has a history of cirrhosis and follows with hepatology but reports her edema was only in her legs in the past and she never required paracentesis. Her diuretics were last increaed on 10/14 per outpatient hepatology note. She notes abdominal tenderness as well more when she tries to lay flat or bend. She denied any fever, chills, nausea, emesis. She also has a cough for the past month and notes traveling frequently on a plane over the last few months.        Assessment & Plan:       #Ascites and anasarca  #hx LOPEZ cirrhosis  #thrombocytopenia  -plt 83 (79), AST 35 (52), Tbili 4.5 (1.9), INR 1.67  -tense ascites present  -plan for therapeutic and diagnostic paracentesis with IR  -cont ome spironolactone, furosemide     #Lingular PNA  #leukopenia  #SIRS positive  -met 3 SIRS criteria: WBC 3.4, , RR 22  -COVID/ RSV/ flu negative  -chest xray showing lingular PNA with small left pleural effusion  -antibiotics: rocephin, azithro  -obtain sputum cx and blood cx  -check lacatate  -albuterol prn, mucinex  -cont to monitor CBC     #hypokalemia  -K 3.4  -ikely 2/2 diuretic use  -replete  -check mg level  -cont to monitor BMP     #hypothyroidism  -cont home levothyroxine     Plan of care discussed with ED physician     Rika Espino DO          PROCEDURE     Procedures signed by Joel Bahena MD at 12/11/2024  1:49 PM    Author:  Joel Bahena MD Service: Interventional Radiology Author Type: Physician   Filed: 12/11/2024  1:49 PM Date of Service: 12/11/2024  1:48 PM Status: Signed   : Joel Bahena MD (Physician)     [facility]              Brief Procedure Report     Pre-Operative Diagnosis: Symptomatic Ascites     Post-Operative Diagnosis: Same as above.     Procedure Performed: Ultrasound guided paracentesis     Anesthesia: 1% lidocaine     EBL: 0     Complications: None     Summary of Case: 3200cc of yellow colored fluid aspirated from the RLQ using a 5 Fr. centesis catheter.  Fluid was sent for routine studies as directed by the ordering physician. Patient tolerated procedure well without immediate complication. Full report to follow in PACS.     Joel Bahena           12/11          Date of Service: 12/11/2024  8:31 AM     Signed              Chief Complaint: my belly is full of fluid     Subjective:      Patient uncomfortable, unable to lay flat     Objective:    Review of Systems:   A comprehensive review of systems was completed; pertinent positive and negatives stated in subjective.     Vital signs:  Temp:  [98.2 °F (36.8 °C)-98.7 °F (37.1 °C)] 98.2 °F (36.8 °C)  Pulse:  [] 93  Resp:  [18-22] 20  BP: (107-138)/(60-85) 136/72  SpO2:  [94 %-98 %] 95 %     Physical Exam:    General: No acute distress  Respiratory: No wheezes, no rhonchi  Cardiovascular: S1, S2, regular rate and rhythm  Abdomen: Soft, Non-tender, positive fluid wave, distended, positive bowel sounds  Neuro: No new focal deficits.   Extremities: No edema        Diagnostic Data:    Labs:       Recent Labs   Lab 12/10/24  1716 12/11/24  0504   WBC 3.4*  --    HGB 15.0  --    MCV 95.1  --    PLT 83.0*  --    INR 1.67* 1.75*             Recent Labs   Lab 12/10/24  1716   *   BUN 6*   CREATSERUM 0.61   CA 8.9   ALB 3.4      K 3.4*      CO2 24.0   ALKPHO 84   AST 35*   ALT 18   BILT 4.5*   TP 6.6            Recent Labs   Lab  12/10/24  1716 12/11/24  0504   PTP 19.8* 20.6*   INR 1.67* 1.75*       Imaging: Reviewed in Epic.     Medications:    azithromycin  500 mg Intravenous Q24H    enoxaparin  40 mg Subcutaneous Daily    [START ON 12/12/2024] furosemide  60 mg Oral Daily    [START ON 12/12/2024] levothyroxine  112 mcg Oral Before breakfast    [START ON 12/12/2024] spironolactone  100 mg Oral Daily    cefTRIAXone  2 g Intravenous Q24H         Assessment & Plan:         #Ascites  #[place] cirrhosis  #Anasarca  #Coagulopathy   -GI to see  -IR for paracentesis   -PTA: lasix 60 / spirolactone 100, cont for now, can consider increasing spironolactone if bp allows      #CAP  -ceftriaxone/azith (12/10-); transition to oral abx on dc  -fu cultures     #Hypokalemia     #Hypothyroidism        Soy Armenta, DO                          MEDICATIONS ADMINISTERED IN LAST 1 DAY:  azithromycin (Zithromax) 500 mg in sodium chloride 0.9% 250mL IVPB premix       Date Action Dose Route User    12/10/2024 2315 New Bag 500 mg Intravenous Tony Agarwal RN          benzonatate (Tessalon) cap 100 mg       Date Action Dose Route User    12/10/2024 2108 Given 100 mg Oral Tony Agarwal RN          cefTRIAXone (Rocephin) 1 g in sodium chloride 0.9% 100 mL IVPB-ADDV       Date Action Dose Route User    12/10/2024 2242 New Bag 1 g Intravenous Tony Agarwal RN            Vitals (last day)       Date/Time Temp Pulse Resp BP SpO2 Weight O2 Device O2 Flow Rate (L/min) Who    12/11/24 1330 98.3 °F (36.8 °C) 84 18 109/54 96 % -- -- -- SM    12/11/24 1300 98.1 °F (36.7 °C) 84 20 119/57 95 % -- None (Room air) -- SM    12/11/24 1219 98.2 °F (36.8 °C) -- -- -- -- -- None (Room air) -- JT    12/11/24 0800 97.9 °F (36.6 °C) 85 18 113/62 94 % -- None (Room air) -- VO    12/11/24 0215 98.2 °F (36.8 °C) 93 20 136/72 95 % -- None (Room air) 0 L/min LD    12/11/24 0030 -- 90 20 107/60 96 % -- None (Room air) -- TC    12/10/24 2245 -- 97 20 135/78 97 % -- None (Room air) --      12/10/24 2130 -- 95 19 126/71 97 % -- None (Room air) --     12/10/24 2030 -- 93 20 117/75 97 % -- None (Room air) --     12/10/24 2023 -- -- -- -- -- -- None (Room air) --     12/10/24 2000 -- 101 22 138/85 96 % -- None (Room air) --     12/10/24 1705 98.7 °F (37.1 °C) 102 18 136/81 94 % -- None (Room air) -- BR

## 2024-12-11 NOTE — PROGRESS NOTES
[facility]   part of [facility]     Hospitalist Progress Note     Balbina Ramirez Patient Status:  Inpatient    1970 MRN [id number]   Location [facility] 0SW-A Attending Soy Armenta, DO   Hosp Day # 0 PCP Dakota Bloom MD     Chief Complaint: my belly is full of fluid    Subjective:     Patient uncomfortable, unable to lay flat    Objective:    Review of Systems:   A comprehensive review of systems was completed; pertinent positive and negatives stated in subjective.    Vital signs:  Temp:  [98.2 °F (36.8 °C)-98.7 °F (37.1 °C)] 98.2 °F (36.8 °C)  Pulse:  [] 93  Resp:  [18-22] 20  BP: (107-138)/(60-85) 136/72  SpO2:  [94 %-98 %] 95 %    Physical Exam:    General: No acute distress  Respiratory: No wheezes, no rhonchi  Cardiovascular: S1, S2, regular rate and rhythm  Abdomen: Soft, Non-tender, positive fluid wave, distended, positive bowel sounds  Neuro: No new focal deficits.   Extremities: No edema      Diagnostic Data:    Labs:  Recent Labs   Lab 12/10/24  1716 12/11/24  0504   WBC 3.4*  --    HGB 15.0  --    MCV 95.1  --    PLT 83.0*  --    INR 1.67* 1.75*       Recent Labs   Lab 12/10/24  1716   *   BUN 6*   CREATSERUM 0.61   CA 8.9   ALB 3.4      K 3.4*      CO2 24.0   ALKPHO 84   AST 35*   ALT 18   BILT 4.5*   TP 6.6       CrCl cannot be calculated (Unknown ideal weight.).    No results for input(s): \"TROP\", \"TROPHS\", \"CK\" in the last 168 hours.    Recent Labs   Lab 12/10/24  1716 24  0504   PTP 19.8* 20.6*   INR 1.67* 1.75*                  Microbiology    No results found for this visit on 12/10/24.      Imaging: Reviewed in Epic.    Medications:    azithromycin  500 mg Intravenous Q24H    enoxaparin  40 mg Subcutaneous Daily    [START ON 2024] furosemide  60 mg Oral Daily    [START ON 2024] levothyroxine  112 mcg Oral Before breakfast    [START ON 2024] spironolactone  100 mg Oral Daily    cefTRIAXone  2 g Intravenous Q24H        Assessment & Plan:      #Dispo  -possible dc later today after para if cleared by IR/GI    #Ascites  #[place] cirrhosis  #Anasarca  #Coagulopathy   -GI to see  -IR for paracentesis   -PTA: lasix 60 / spirolactone 100, cont for now, can consider increasing spironolactone if bp allows     #CAP  -ceftriaxone/azith (12/10-); transition to oral abx on dc  -fu cultures    #Hypokalemia    #Hypothyroidism      Soy Armenta,     Supplementary Documentation:     Quality:  DVT Mechanical Prophylaxis:   SCDs,    DVT Pharmacologic Prophylaxis   Medication    enoxaparin (Lovenox) 40 MG/0.4ML SUBQ injection 40 mg                Code Status: Not on file  Woodruff: No urinary catheter in place  Woodruff Duration (in days):   Central line:    ADRIANA:     Discharge is dependent on: clinical improvement   At this point Ms. Ramirez is expected to be discharge to: home    The 21st Century Cures Act makes medical notes like these available to patients in the interest of transparency. Please be advised this is a medical document. Medical documents are intended to carry relevant information, facts as evident, and the clinical opinion of the practitioner. The medical note is intended as peer to peer communication and may appear blunt or direct. It is written in medical language and may contain abbreviations or verbiage that are unfamiliar.

## 2024-12-11 NOTE — H&P
[facility]     Balbina Ramirez Patient Status:  Emergency    1970 MRN [id number]   Location [facility] EMERGENCY DEPARTMENT Attending Antonio Ocasio MD   Hosp Day # 0 PCP Dakota Bloom MD     Chief Complaint: abdominal swelling    Subjective:    History of Present Illness:     Balbina Ramirez is a 54 year old female with PMHx LOPEZ cirrhosis/ HLD/ hypothyroidism/ GERD who presented to the hospital for abdominal swelling. She noticed increased swelling over the past week in her abdomen to the point she could n ot fit into some of her clothes. She has a history of cirrhosis and follows with hepatology but reports her edema was only in her legs in the past and she never required paracentesis. Her diuretics were last increaed on 10/14 per outpatient hepatology note. She notes abdominal tenderness as well more when she tries to lay flat or bend. She denied any fever, chills, nausea, emesis. She also has a cough for the past month and notes traveling frequently on a plane over the last few months.     History/Other:    Past Medical History:  Past Medical History:    Cancer (HCC)    skin    Disorder of liver    cirrhosis    Disorder of thyroid    HIGH CHOLESTEROL    History of mumps    History of varicella    Human papillomavirus in conditions classified elsewhere and of unspecified site    when 20    In vitro fertilization    d/t infertility    Myalgia and myositis, unspecified    Rosacea    Rosacea    Unspecified hypothyroidism    Visual impairment    reading glasses     Past Surgical History:   Past Surgical History:   Procedure Laterality Date    Breast biopsy            Colonoscopy N/A 3/11/2022    Procedure: COLONOSCOPY with cold snare polypectomy;  Surgeon: Gatito Johnson MD;  Location:  ENDOSCOPY    Colposcopy, cervix w/upper adjacent vagina; w/biopsy(s), cervix  ,     HPV, nl    Cryocautery of cervix      HPV    D & c      Endometrial  ablation  2007    Foot/toes surgery proc unlisted      R bunionectomy    Samina biopsy stereo nodule 1 site left (cpt=19081)  03/2014    PAULA    Samina biopsy stereo nodule 1 site right (cpt=19081)  3/2014    Samina biopsy stereo nodule 2 site bilat (cpt=19081/87621)  3/2014    Other surgical history      L knee surgery    Tonsillectomy        Family History:   Family History   Problem Relation Age of Onset    High Cholesterol Mother         hypercholesterolemia    Hypertension Mother     Cancer Mother         Lymphoma, diagnosed in Oct 2012, Non Hodgkins Leukemia    Breast Cancer Mother 69    Thyroid disease Mother         hypothyroidism    Cancer Father         Basal cell skin Ca    Cancer Self 28        basal cell skin cancer    Breast Cancer Sister 47    Cancer Other         No family hx Ovarian/Colon Ca    No Known Problems Brother      Social History:    reports that she has quit smoking. Her smoking use included cigarettes. She has never used smokeless tobacco. She reports that she does not currently use alcohol. She reports that she does not use drugs.     Allergies: Allergies[1]    Medications:  Medications Ordered Prior to Encounter[2]    Review of Systems:   A comprehensive review of systems was completed.    Pertinent positives and negatives noted in the HPI.    Objective:   Physical Exam:    /71   Pulse 95   Temp 98.7 °F (37.1 °C) (Temporal)   Resp 19   Ht 5' 2\" (1.575 m)   SpO2 97%   BMI 41.34 kg/m²   General: No acute distress, Alert  Respiratory: No rhonchi, no wheezes, on room air  Cardiovascular: S1, S2. Regular rate and rhythm  Abdomen: Soft, Non-tender, distended with fluid wave, positive bowel sounds  Neuro: No new focal deficits  Extremities: +1 non-pitting bl pre-tibial edema    Results:    Labs:      Labs Last 24 Hours:    Recent Labs   Lab 12/10/24  1716   RBC 4.53   HGB 15.0   HCT 43.1   MCV 95.1   MCH 33.1   MCHC 34.8   RDW 13.2   NEPRELIM 1.97   WBC 3.4*   PLT 83.0*       Recent Labs    Lab 12/10/24  1716   *   BUN 6*   CREATSERUM 0.61   EGFRCR 105   CA 8.9   ALB 3.4      K 3.4*      CO2 24.0   ALKPHO 84   AST 35*   ALT 18   BILT 4.5*   TP 6.6       Lab Results   Component Value Date    INR 1.67 (H) 12/10/2024       No results for input(s): \"TROP\", \"TROPHS\", \"CK\" in the last 168 hours.    No results for input(s): \"TROP\", \"PBNP\" in the last 168 hours.    No results for input(s): \"PCT\" in the last 168 hours.    Imaging: Imaging data reviewed in Epic.    Assessment & Plan:      #Ascites and anasarca  #hx LOPEZ cirrhosis  #thrombocytopenia  -plt 83 (79), AST 35 (52), Tbili 4.5 (1.9), INR 1.67  -tense ascites present  -plan for therapeutic and diagnostic paracentesis with IR  -cont ome spironolactone, furosemide    #Lingular PNA  #leukopenia  #SIRS positive  -met 3 SIRS criteria: WBC 3.4, , RR 22  -COVID/ RSV/ flu negative  -chest xray showing lingular PNA with small left pleural effusion  -antibiotics: rocephin, azithro  -obtain sputum cx and blood cx  -check lacatate  -albuterol prn, mucinex  -cont to monitor CBC    #hypokalemia  -K 3.4  -ikely 2/2 diuretic use  -replete  -check mg level  -cont to monitor BMP    #hypothyroidism  -cont home levothyroxine          Plan of care discussed with ED physician    Rika Espino DO    Supplementary Documentation:     The 21st Century Cures Act makes medical notes like these available to patients in the interest of transparency. Please be advised this is a medical document. Medical documents are intended to carry relevant information, facts as evident, and the clinical opinion of the practitioner. The medical note is intended as peer to peer communication and may appear blunt or direct. It is written in medical language and may contain abbreviations or verbiage that are unfamiliar.                                       [1]   Allergies  Allergen Reactions    Neosporin [Neomycin-Bacitracin-Polymyxin] SWELLING     Ointment    Augmentin  [Amoxicillin-Pot Clavulanate] RASH   [2]   No current facility-administered medications on file prior to encounter.     Current Outpatient Medications on File Prior to Encounter   Medication Sig Dispense Refill    Fluocinolone Acetonide Scalp 0.01 % External Oil APPLY TO SCALP AND LEAVE IT OVERNIGHT OR > 4 HOURS BEFORE WASHING      levothyroxine 112 MCG Oral Tab Take 1 tablet (112 mcg total) by mouth before breakfast. DUE FOR LAB WORK ASAP 90 tablet 0    ketoconazole 2 % External Shampoo every 28 days. FOR 21 DAYS IN, THEN REMOVE FOR 7 DAYS      spironolactone 50 MG Oral Tab Take 1 tablet (50 mg total) by mouth daily. (Patient taking differently: Take 2 tablets (100 mg total) by mouth daily.) 30 tablet 5    Sulfacetamide Sodium-Sulfur 10-5 % External Liquid Apply topically daily. (Patient not taking: Reported on 12/10/2024)      Cholecalciferol (VITAMIN D) 50 MCG (2000 UT) Oral Cap Take by mouth.      furosemide 40 MG Oral Tab Take 1.5 tablets (60 mg total) by mouth 2 (two) times daily. (Patient taking differently: Take 1.5 tablets (60 mg total) by mouth daily.) 90 tablet 11

## 2024-12-11 NOTE — PLAN OF CARE
Patient alert and orientated, VSS, RA, afebrile and does not complain of pain. Patient had low to no appetite this morning due to having paracentesis this am. Paracentesis complete  and 3.2 L were removed. Diet resumed and patient eating. Call light and safety precautions in place. Plan for discharge.     Problem: Patient/Family Goals  Goal: Patient/Family Long Term Goal  Description: Patient's Long Term Goal:     Interventions:  - See additional Care Plan goals for specific interventions  Outcome: Progressing  Goal: Patient/Family Short Term Goal  Description: Patient's Short Term Goal:     Interventions:   - See additional Care Plan goals for specific interventions  Outcome: Progressing     Problem: GASTROINTESTINAL - ADULT  Goal: Maintains or returns to baseline bowel function  Description: INTERVENTIONS:  - Assess bowel function  - Maintain adequate hydration with IV or PO as ordered and tolerated  - Evaluate effectiveness of GI medications  - Encourage mobilization and activity  - Obtain nutritional consult as needed  - Establish a toileting routine/schedule  - Consider collaborating with pharmacy to review patient's medication profile  Outcome: Progressing  Goal: Maintains adequate nutritional intake (undernourished)  Description: INTERVENTIONS:  - Monitor percentage of each meal consumed  - Identify factors contributing to decreased intake, treat as appropriate  - Assist with meals as needed  - Monitor I&O, WT and lab values  - Obtain nutritional consult as needed  - Optimize oral hygiene and moisture  - Encourage food from home; allow for food preferences  - Enhance eating environment  Outcome: Progressing     Problem: METABOLIC/FLUID AND ELECTROLYTES - ADULT  Goal: Electrolytes maintained within normal limits  Description: INTERVENTIONS:  - Monitor labs and rhythm and assess patient for signs and symptoms of electrolyte imbalances  - Administer electrolyte replacement as ordered  - Monitor response to  electrolyte replacements, including rhythm and repeat lab results as appropriate  - Fluid restriction as ordered  - Instruct patient on fluid and nutrition restrictions as appropriate  Outcome: Progressing     Problem: DISCHARGE PLANNING  Goal: Discharge to home or other facility with appropriate resources  Description: INTERVENTIONS:  - Identify barriers to discharge w/pt and caregiver  - Include patient/family/discharge partner in discharge planning  - Arrange for needed discharge resources and transportation as appropriate  - Identify discharge learning needs (meds, wound care, etc)  - Arrange for interpreters to assist at discharge as needed  - Consider post-discharge preferences of patient/family/discharge partner  - Complete POLST form as appropriate  - Assess patient's ability to be responsible for managing their own health  - Refer to Case Management Department for coordinating discharge planning if the patient needs post-hospital services based on physician/LIP order or complex needs related to functional status, cognitive ability or social support system  Outcome: Progressing

## 2024-12-11 NOTE — DIETARY NOTE
[facility]   part of [facility]   CLINICAL NUTRITION    Balbina Ramirez     Admitting diagnosis:  Other ascites [R18.8]  Edema, unspecified type [R60.9]    Ht: 157.5 cm (5' 2\")  Wt: pt refused   Body mass index is 41.34 kg/m².  IBW: 47.7kg    Wt Readings from Last 6 Encounters:   09/09/24 102.5 kg (226 lb)   02/21/24 97.5 kg (215 lb)   02/05/24 96.4 kg (212 lb 9.6 oz)   11/13/23 105.2 kg (232 lb)   12/05/22 90.7 kg (200 lb)   08/03/22 88 kg (194 lb)        Labs/Meds reviewed    Diet:       Procedures    Sodium restricted diet Sodium Restriction: 2 GM NA; Is Patient on Accuchecks? No     Percent Meals Eaten (last 3 days)       None            Pt chart reviewed d/t +MST.54 year old female with PMHx LOPEZ cirrhosis/ HLD/ hypothyroidism/ GERD who presented 12/10 abdominal swelling .Out of room for procedure, will revisit as able. Reduced appetite likely due to ascites.   Nursing notes reports   intake for last meal.  No significant weight changes noted, though refused admit wt.     Patient is at low nutrition risk at this time.    Please consult if patient status changes or nutrition issues arise.    Faina Harvey RD, LDN  Clinical Nutrition  [id number]

## 2024-12-11 NOTE — PROGRESS NOTES
NURSING DISCHARGE NOTE    Discharged Home via Wheelchair.  Accompanied by Support staff  Belongings Taken by patient/family.    Removed IV and patient tolerated well. All belongings were taken. Discharge papers were given and all questions were answered. PCT took patient to car in [facility].

## 2024-12-12 ENCOUNTER — PATIENT OUTREACH (OUTPATIENT)
Dept: CASE MANAGEMENT | Age: 54
End: 2024-12-12

## 2024-12-12 DIAGNOSIS — R18.8 CIRRHOSIS OF LIVER WITH ASCITES, UNSPECIFIED HEPATIC CIRRHOSIS TYPE (HCC): Primary | ICD-10-CM

## 2024-12-12 DIAGNOSIS — K74.60 CIRRHOSIS OF LIVER WITH ASCITES, UNSPECIFIED HEPATIC CIRRHOSIS TYPE (HCC): Primary | ICD-10-CM

## 2024-12-12 NOTE — CDS QUERY
Dear Dr Armenta,   Can the patient's BMI of 41.34 be further defined?    [  x ] Morbid Obesity  [   ] Other - please specify:       Clinical Indicators:   12/10 H&P: /71  Pulse 95  Temp 98.7 °F (37.1 °C) (Temporal)  Resp 19  Ht 5' 2\" (1.575 m)    SpO2 97%  BMI 41.34 kg/m²     Risk Factors: Pen[place]ton Cirrhosis.     Treatment:  Use of 2 people for lifts, turns, transfers as needed    Use of terms such as suspected, possible, or probable (associated with a specific diagnosis that is being evaluated, monitored, or treated as if it exists) are acceptable and can be coded in the inpatient setting, when documented at the time of discharge.       For questions regarding this query, please contact Lead Clinical :   Siri Gasca RN, CCDS     Cell# [phone]                          Thank you                                             THIS FORM IS A PERMANENT PART OF THE MEDICAL RECORD

## 2024-12-13 NOTE — PAYOR COMM NOTE
--------------  DISCHARGE REVIEW    Payor: [employer] OUT OF STATE PPO  Subscriber #:  [id number]  Authorization Number: [id number]    Admit date: 12/11/24  Admit time:   2:09 AM  Discharge Date: 12/11/2024  3:30 PM     Admitting Physician: Deepthi Cook DO  Attending Physician:  No att. providers found  Primary Care Physician: Dakota Bloom MD

## 2024-12-16 ENCOUNTER — PATIENT MESSAGE (OUTPATIENT)
Dept: INTERNAL MEDICINE CLINIC | Facility: CLINIC | Age: 54
End: 2024-12-16

## 2024-12-16 NOTE — TELEPHONE ENCOUNTER
What test? I did not order anything. Pt should check with who order what ever test she is questioning.

## 2024-12-19 ENCOUNTER — TELEPHONE (OUTPATIENT)
Dept: INTERNAL MEDICINE CLINIC | Facility: CLINIC | Age: 54
End: 2024-12-19

## 2024-12-19 NOTE — TELEPHONE ENCOUNTER
Pt aware      Augusto Lantigua MD  General Surgery  [facility]  Phone: [phone]   [FreeTextEntry1] : Randee presents to the office for counseling with her daughter. \par \par The patient presented to the office today for counseling regarding her decision for surgical treatment of her prolapse. All pertinent prior studies including \par Urodynamic testing were reviewed. She was counseled regarding alternative \par nonsurgical therapies as well as the prognosis with no intervention.\par \par The patient was advised regarding various surgical options including abdominal, laparoscopic and vaginal approaches. The risks and benefits of surgery were fully reviewed. She was informed of the potential for infection, pain, leaking, \par disturbance in bladder function, any of which may require additional surgery for revision. She is aware that she will not be able to have vaginal intercourse ever again following this obliterative procedure.\par \par The patient was advised regarding various surgical options for the  stress incontinence including abdominal, laparoscopic, transurethral and vaginal approaches, allograft (harvesting one’s own tissue), xenograft (using tissue from donor) and synthetic grafts.  The risks and benefits of surgery using graft insertion (mesh) were fully reviewed.  She was informed of the potential for improved durability and the inherent risk of mesh use including but not limited to infection, erosion and chronic inflammation, acute and chronic pain, pain with intercourse (both of which may be refractory to treatment) fistula, disturbance in bladder function, any of which may require additional surgery for mesh revision.  She is aware that the mesh used in her surgery is a permanent mesh.  The patient was advised regarding the July 2011 FDA notification regarding these issues and provided the website address for further reference www.fda.gov.  \par \par The general risks of the surgery were reviewed including but not limited to \par infection, bleeding, surrounding organ or tissue injury, failure meaning continued leaking, voiding dysfunction, needing to go home with a catheter, \par and the risks of anesthesia.\par \par The patient is aware that learner’s (medical students/residents/fellows) may be participating in a pelvic exam under anesthesia.\par \par The approximate length of the surgery hospital stay and postoperative recovery period were reviewed, including a general overview of the convalescence and postoperative follow-up.\par \par [] PAP done today (STAT)\par [] consent for pelvic EUA lefort, levatorplasty, MUS, cysto, any other indicated procedures \par

## 2024-12-19 NOTE — TELEPHONE ENCOUNTER
SM: spoke with pt.   Pt stated that pt was in ER and developed pneumonia and then had fluid removed from abdomen due to fluid build up. Pt stated she has developed a naval herniation since then. Her belly button sticks out. She stated that she spoke with her liver doctor, Dr. Coronel, and he stated that she may  possibly need the naval herniation corrected by a surgeon.      Pt is asking for a recommendation on a surgeon that could correct that? Please advise. Thanks!     **pt does not want a mcm, but a call back*

## 2024-12-19 NOTE — TELEPHONE ENCOUNTER
Patient advised by Hepatologist to find a surgeon due to a herniated nasal. Patient is asking for a call back with recommendations from Dr. Bloom, F/u to discuss furthermore.     Insurance is [employer] PPO.

## 2024-12-26 RX ORDER — ALBUMIN (HUMAN) 12.5 G/50ML
50 SOLUTION INTRAVENOUS ONCE
Status: COMPLETED | OUTPATIENT
Start: 2024-12-27 | End: 2024-12-27

## 2024-12-27 ENCOUNTER — NURSE ONLY (OUTPATIENT)
Dept: LAB | Facility: HOSPITAL | Age: 54
End: 2024-12-27
Attending: INTERNAL MEDICINE
Payer: COMMERCIAL

## 2024-12-27 ENCOUNTER — HOSPITAL ENCOUNTER (OUTPATIENT)
Dept: ULTRASOUND IMAGING | Facility: HOSPITAL | Age: 54
Discharge: HOME | End: 2024-12-27
Attending: INTERNAL MEDICINE
Payer: COMMERCIAL

## 2024-12-27 VITALS
TEMPERATURE: 98 F | WEIGHT: 226 LBS | RESPIRATION RATE: 16 BRPM | SYSTOLIC BLOOD PRESSURE: 104 MMHG | BODY MASS INDEX: 41.59 KG/M2 | HEIGHT: 62 IN | HEART RATE: 87 BPM | OXYGEN SATURATION: 97 % | DIASTOLIC BLOOD PRESSURE: 54 MMHG

## 2024-12-27 DIAGNOSIS — K74.60 CIRRHOSIS OF LIVER WITH ASCITES, UNSPECIFIED HEPATIC CIRRHOSIS TYPE (HCC): ICD-10-CM

## 2024-12-27 DIAGNOSIS — K74.60 CIRRHOSIS (HCC): Primary | ICD-10-CM

## 2024-12-27 DIAGNOSIS — K74.60 CIRRHOSIS (HCC): ICD-10-CM

## 2024-12-27 DIAGNOSIS — R18.8 CIRRHOSIS OF LIVER WITH ASCITES, UNSPECIFIED HEPATIC CIRRHOSIS TYPE (HCC): ICD-10-CM

## 2024-12-27 LAB
INR BLD: 1.79 (ref 0.8–1.2)
PROTHROMBIN TIME: 20.9 SECONDS (ref 11.6–14.8)

## 2024-12-27 PROCEDURE — 49083 ABD PARACENTESIS W/IMAGING: CPT | Performed by: INTERNAL MEDICINE

## 2024-12-27 PROCEDURE — P9047 ALBUMIN (HUMAN), 25%, 50ML: HCPCS

## 2024-12-27 PROCEDURE — 36415 COLL VENOUS BLD VENIPUNCTURE: CPT

## 2024-12-27 PROCEDURE — 85610 PROTHROMBIN TIME: CPT

## 2024-12-27 RX ORDER — ONDANSETRON 2 MG/ML
4 INJECTION INTRAMUSCULAR; INTRAVENOUS ONCE AS NEEDED
Status: ACTIVE | OUTPATIENT
Start: 2024-12-27 | End: 2024-12-27

## 2024-12-27 RX ORDER — ALBUMIN (HUMAN) 12.5 G/50ML
SOLUTION INTRAVENOUS
Status: COMPLETED
Start: 2024-12-27 | End: 2024-12-27

## 2024-12-27 RX ADMIN — ALBUMIN (HUMAN) 50 G: 12.5 SOLUTION INTRAVENOUS at 15:43:00

## 2024-12-27 NOTE — IMAGING NOTE
Balbina Ramirez for image guided paracentesis.  Informed consent obtained by SongAfter and Dr. Baker. Procedure completed. Presently denies pain. Tolerated procedure well.   Transported pt to Rm 2251 accompanied by SongAfter. SBAR called to PRIYA Mcdonald.

## 2024-12-27 NOTE — DISCHARGE INSTRUCTIONS
Home Care [facility] Department of Radiology  Paracentesis    Activity  Take it easy for the rest of the day after your procedure. You may be sore at the site for the next five days. Do not do any strenuous exercises or lift over 5 lbs. for the next 24 hours.    Site Care  Keep a bandage on the site for the next 24 hours.  You may shower after 24 hours, no soaking in a bath tub for 7 days.    Diet  Resume your usual diet.    Pain Management  You may use a covered ice pack for topical pain relief, as needed.    Medications  You may resume your home medications. If you take blood thinners, you may resume them starting the day after your procedure.    When to seek medical advice  Call your ordering provider with questions and to discuss test results.   If you have any questions or concerns about the procedure, please call the Radiology Nurse at [phone] from 8am - 5pm, Monday-Friday. After those hours, Dial [phone] and ask the Radiology Department  to page the on-call Interventional Radiologist if any of these occur:      There is a change in color or temperature of the area where the procedure was performed.  There is more than a small amount of fluid leaking from the puncture site.  You develop increasing pain or shortness of breath.  Unusual drowsiness, weakness, or dizziness gets worse.  Unusual vomiting.    IF YOU FEEL YOU ARE EXPERIENCIG AN EMERGENCY,   CALL 911 OR GO TO THE NEAREST EMERGENCY ROOM.      Dr. Baker 6.4 Liters removed     4.2.24 MO/  Radiology

## 2024-12-27 NOTE — PROCEDURES
[facility]   part of [facility]  Procedure Note    Balbina Ramirez Patient Status:  Outpatient    1970 MRN [id number]   Location [facility] ULTRASOUND Attending James Coronel MD    Day # 0 PCP Dakota Bloom MD     Procedure: US guided paracentesis    Pre-Procedure Diagnosis:  ascites    Post-Procedure Diagnosis: same    Anesthesia:  Local    Findings:  Turbid tan fluid    Specimens: 6.4 L    Blood Loss:  <10 ml    Tourniquet Time: n/a  Complications:  None  Drains:  removed    Secondary Diagnosis:  none    Maxime Baker MD  2024

## 2025-01-15 ENCOUNTER — OFFICE VISIT (OUTPATIENT)
Dept: SURGERY | Facility: CLINIC | Age: 55
End: 2025-01-15
Payer: COMMERCIAL

## 2025-01-15 VITALS
SYSTOLIC BLOOD PRESSURE: 128 MMHG | RESPIRATION RATE: 20 BRPM | OXYGEN SATURATION: 98 % | DIASTOLIC BLOOD PRESSURE: 62 MMHG | HEART RATE: 93 BPM | TEMPERATURE: 97 F

## 2025-01-15 DIAGNOSIS — R18.8 OTHER ASCITES: ICD-10-CM

## 2025-01-15 DIAGNOSIS — K42.9 UMBILICAL HERNIA WITHOUT OBSTRUCTION AND WITHOUT GANGRENE: ICD-10-CM

## 2025-01-15 DIAGNOSIS — K74.60 LIVER CIRRHOSIS SECONDARY TO NASH (NONALCOHOLIC STEATOHEPATITIS) (HCC): Primary | ICD-10-CM

## 2025-01-15 DIAGNOSIS — K75.81 LIVER CIRRHOSIS SECONDARY TO NASH (NONALCOHOLIC STEATOHEPATITIS) (HCC): Primary | ICD-10-CM

## 2025-01-15 NOTE — H&P
Patient ID: Balbina Ramirez is a 54 year old female.    Chief Complaint   Patient presents with    New Patient     NP- Abdominal Hernia- denies imaging, reports abdominal swelling         HPI: Balbina Ramirez is a 54 year old female presents to clinic for evaluation.  Patient has significant history for Pennington cirrhosis.  In December of last year, she was diagnosed with pneumonia.  Since then, she has noticed worsening abdominal distention and has required multiple episodes of paracentesis for ascites.  During this time, patient has noticed that her umbilicus has began to protrude more than normal.  She follows with her gastroenterologist/hepatologist Dr. Coronel of [facility].  He recommend that she follow-up with general surgery for further evaluation.  Today, patient reports reaccumulation of her ascites and is due for another paracentesis which is scheduled this Monday.  She has since undergone 2 paracentesis, the first taking 3 L of in the next 6 L.  She denies any pain at her umbilicus.  She denies any nausea, vomiting, or change of bowel habits.  She feels that she can push the hernia in but it is persistent.  Her abdominal surgery include a .    Workup: None      Past Medical History  Past Medical History:    Cancer (HCC)    skin    Disorder of liver    cirrhosis    Disorder of thyroid    HIGH CHOLESTEROL    History of mumps    History of varicella    Human papillomavirus in conditions classified elsewhere and of unspecified site    when 20    Hx of motion sickness    In vitro fertilization    d/t infertility    Myalgia and myositis, unspecified    Rosacea    Rosacea    Unspecified hypothyroidism    Visual impairment    reading glasses       Past Surgical History  Past Surgical History:   Procedure Laterality Date    Abd paracentesis  2024    Abdominal paracentesis  2024    Breast biopsy            Colonoscopy N/A 2022    Procedure: COLONOSCOPY with cold snare polypectomy;   Surgeon: Gatito Johnson MD;  Location:  ENDOSCOPY    Colposcopy, cervix w/upper adjacent vagina; w/biopsy(s), cervix  1994, 2014    HPV, nl    Cryocautery of cervix  1994    HPV    D & c  2003    Endometrial ablation  2007    Foot/toes surgery proc unlisted      R bunionectomy    Samina biopsy stereo nodule 1 site left (cpt=19081)  03/2014    PAULA    Samina biopsy stereo nodule 1 site right (cpt=19081)  03/2014    Samina biopsy stereo nodule 2 site bilat (cpt=19081/31208)  03/2014    Other surgical history      L knee surgery    Tonsillectomy         Medications  Current Outpatient Medications   Medication Sig Dispense Refill    Fluocinolone Acetonide Scalp 0.01 % External Oil APPLY TO SCALP AND LEAVE IT OVERNIGHT OR > 4 HOURS BEFORE WASHING      levothyroxine 112 MCG Oral Tab Take 1 tablet (112 mcg total) by mouth before breakfast. DUE FOR LAB WORK ASAP 90 tablet 0    ketoconazole 2 % External Shampoo every 28 days. FOR 21 DAYS IN, THEN REMOVE FOR 7 DAYS      spironolactone 50 MG Oral Tab Take 1 tablet (50 mg total) by mouth daily. (Patient taking differently: Take 2 tablets (100 mg total) by mouth daily.) 30 tablet 5    Sulfacetamide Sodium-Sulfur 10-5 % External Liquid Apply topically daily.      Cholecalciferol (VITAMIN D) 50 MCG (2000 UT) Oral Cap Take 1 capsule (2,000 Units total) by mouth daily.      furosemide 40 MG Oral Tab Take 1.5 tablets (60 mg total) by mouth 2 (two) times daily. (Patient taking differently: Take 1.5 tablets (60 mg total) by mouth daily.) 90 tablet 11       Allergies  Allergies[1]    Social History  History   Smoking Status    Former    Types: Cigarettes   Smokeless Tobacco    Never     History   Alcohol Use Not Currently     History   Drug Use No       Family History  Family History   Problem Relation Age of Onset    High Cholesterol Mother         hypercholesterolemia    Hypertension Mother     Cancer Mother         Lymphoma, diagnosed in Oct 2012, Non Hodgkins Leukemia    Breast Cancer  Mother 69    Thyroid disease Mother         hypothyroidism    Cancer Father         Basal cell skin Ca    Cancer Self 28        basal cell skin cancer    Breast Cancer Sister 47    Cancer Other         No family hx Ovarian/Colon Ca    No Known Problems Brother        Review of Systems  Review of Systems   Constitutional: Negative.    Respiratory: Negative.     Cardiovascular: Negative.    Gastrointestinal:  Positive for abdominal distention. Negative for abdominal pain, constipation, diarrhea, nausea and vomiting.       Exam  Vitals:    01/15/25 1357   BP: 128/62   Pulse: 93   Resp: 20   Temp: 97.3 °F (36.3 °C)     Physical Exam  Constitutional:       Appearance: Normal appearance. She is obese.   Cardiovascular:      Rate and Rhythm: Normal rate.   Pulmonary:      Effort: Pulmonary effort is normal.   Abdominal:      General: There is distension.      Palpations: There is fluid wave.      Tenderness: There is no abdominal tenderness.      Hernia: A hernia is present. Hernia is present in the umbilical area.       Musculoskeletal:      Right lower leg: Edema present.      Left lower leg: Edema present.   Skin:     General: Skin is warm and dry.      Coloration: Skin is jaundiced.   Neurological:      Mental Status: She is alert and oriented to person, place, and time.           Assessment/Plan  Assessment   Problem List Items Addressed This Visit          HCC Problems    Liver cirrhosis secondary to LOEPZ (nonalcoholic steatohepatitis) (HCC) - Primary       Gastrointestinal and Abdominal    Other ascites    Umbilical hernia without obstruction and without gangrene       Balbinajayne Ramirez is a 54 year old female with an umbilical hernia, most likely decompensated liver cirrhosis secondary to LOPEZ    Patient's serology done in December revealed a meld of 27 and a Al class C  Patient is still requiring paracentesis  She is set to see her hepatologist later this month  On physical exam, patient does have an umbilical  hernia that is currently not compromised  She has significant ascites that will need paracentesis soon  I discussed with the patient the results of her labs in December that at that time she is considered extremely high risk for any abdominal surgery  The fact that patient is still requiring paracentesis and is taking more more fluid off means that that risk is still elevated    At this time, no surgical intervention  Patient should follow-up with her hepatologist  She will need medical optimization of her cirrhosis  If her cirrhosis can be controlled and patient's risk is significantly decreased, can plan for elective repair of her umbilical hernia  In the meantime, patient should watch for skin color changes and/or ulcerations to her umbilical hernia  If she has any of these, concern will be for erosion into the peritoneal cavity leading to leaking of ascites  If this happens, patient will need emergency surgery to repair the hernia  I will have patient follow-up in 3 months  If she develops any new symptoms during this time, she is to contact my office to be evaluated sooner      Linda Blackwell MD  General Surgery  [facility]     CC:  Dakota Bloom MD         [1]   Allergies  Allergen Reactions    Neosporin [Neomycin-Bacitracin-Polymyxin] SWELLING     Ointment    Augmentin [Amoxicillin-Pot Clavulanate] RASH

## 2025-01-17 RX ORDER — ALBUMIN (HUMAN) 12.5 G/50ML
50 SOLUTION INTRAVENOUS ONCE
Status: COMPLETED | OUTPATIENT
Start: 2025-01-20 | End: 2025-01-20

## 2025-01-20 ENCOUNTER — NURSE ONLY (OUTPATIENT)
Dept: LAB | Facility: HOSPITAL | Age: 55
End: 2025-01-20
Attending: INTERNAL MEDICINE
Payer: COMMERCIAL

## 2025-01-20 ENCOUNTER — HOSPITAL ENCOUNTER (OUTPATIENT)
Dept: ULTRASOUND IMAGING | Facility: HOSPITAL | Age: 55
Discharge: HOME | End: 2025-01-20
Attending: INTERNAL MEDICINE
Payer: COMMERCIAL

## 2025-01-20 VITALS
HEIGHT: 62 IN | DIASTOLIC BLOOD PRESSURE: 53 MMHG | RESPIRATION RATE: 16 BRPM | TEMPERATURE: 98 F | WEIGHT: 226 LBS | SYSTOLIC BLOOD PRESSURE: 110 MMHG | HEART RATE: 95 BPM | BODY MASS INDEX: 41.59 KG/M2 | OXYGEN SATURATION: 95 %

## 2025-01-20 DIAGNOSIS — K74.60 CIRRHOSIS OF LIVER WITH ASCITES, UNSPECIFIED HEPATIC CIRRHOSIS TYPE (HCC): ICD-10-CM

## 2025-01-20 DIAGNOSIS — R18.8 CIRRHOSIS OF LIVER WITH ASCITES, UNSPECIFIED HEPATIC CIRRHOSIS TYPE (HCC): ICD-10-CM

## 2025-01-20 DIAGNOSIS — R18.8 CIRRHOSIS OF LIVER WITH ASCITES, UNSPECIFIED HEPATIC CIRRHOSIS TYPE (HCC): Primary | ICD-10-CM

## 2025-01-20 DIAGNOSIS — K74.60 CIRRHOSIS OF LIVER WITH ASCITES, UNSPECIFIED HEPATIC CIRRHOSIS TYPE (HCC): Primary | ICD-10-CM

## 2025-01-20 LAB
ABSOLUTE BASOPHILS: 20 CELLS/UL (ref 0–200)
ABSOLUTE EOSINOPHILS: 90 CELLS/UL (ref 15–500)
ABSOLUTE LYMPHOCYTES: 966 CELLS/UL (ref 850–3900)
ABSOLUTE MONOCYTES: 300 CELLS/UL (ref 200–950)
ABSOLUTE NEUTROPHILS: 1425 CELLS/UL (ref 1500–7800)
ALBUMIN/GLOBULIN RATIO: 1.1 (CALC) (ref 1–2.5)
ALBUMIN: 3.1 G/DL (ref 3.6–5.1)
ALKALINE PHOSPHATASE: 79 U/L (ref 37–153)
ALPHA FETOPROTEIN,$TUMOR MARKER: 11.2 NG/ML
ALT: 16 U/L (ref 6–29)
AST: 25 U/L (ref 10–35)
BASOPHILS: 0.7 %
BILIRUBIN, TOTAL: 4.4 MG/DL (ref 0.2–1.2)
BUN: 9 MG/DL (ref 7–25)
CALCIUM: 8.3 MG/DL (ref 8.6–10.4)
CARBON DIOXIDE: 25 MMOL/L (ref 20–32)
CHLORIDE: 104 MMOL/L (ref 98–110)
CREATININE: 0.55 MG/DL (ref 0.5–1.03)
EGFR: 108 ML/MIN/1.73M2
EOSINOPHILS: 3.2 %
ERYTHROCYTE [DISTWIDTH] IN BLOOD BY AUTOMATED COUNT: 13.5 %
GLOBULIN: 2.8 G/DL (CALC) (ref 1.9–3.7)
GLUCOSE: 99 MG/DL (ref 65–99)
HCT VFR BLD AUTO: 44.3 %
HEMATOCRIT: 42.3 % (ref 35–45)
HEMOGLOBIN: 14.5 G/DL (ref 11.7–15.5)
HGB BLD-MCNC: 15.2 G/DL
INR BLD: 1.84 (ref 0.8–1.2)
LYMPHOCYTES: 34.5 %
MCH RBC QN AUTO: 32.8 PG (ref 26–34)
MCH: 32.2 PG (ref 27–33)
MCHC RBC AUTO-ENTMCNC: 34.3 G/DL (ref 31–37)
MCHC: 34.3 G/DL (ref 32–36)
MCV RBC AUTO: 95.5 FL
MCV: 94 FL (ref 80–100)
MONOCYTES: 10.7 %
MPV: 11.5 FL (ref 7.5–12.5)
NEUTROPHILS: 50.9 %
PLATELET # BLD AUTO: 93 10(3)UL (ref 150–450)
PLATELET COUNT: 98 THOUSAND/UL (ref 140–400)
PLATELETS.RETICULATED NFR BLD AUTO: 3.3 % (ref 0–7)
POTASSIUM: 3.5 MMOL/L (ref 3.5–5.3)
PROTEIN, TOTAL: 5.9 G/DL (ref 6.1–8.1)
PROTHROMBIN TIME: 21.4 SECONDS (ref 11.6–14.8)
RBC # BLD AUTO: 4.64 X10(6)UL
RDW: 13.4 % (ref 11–15)
RED BLOOD CELL COUNT: 4.5 MILLION/UL (ref 3.8–5.1)
SODIUM: 138 MMOL/L (ref 135–146)
WBC # BLD AUTO: 3.1 X10(3) UL (ref 4–11)
WHITE BLOOD CELL COUNT: 2.8 THOUSAND/UL (ref 3.8–10.8)

## 2025-01-20 PROCEDURE — 49083 ABD PARACENTESIS W/IMAGING: CPT | Performed by: INTERNAL MEDICINE

## 2025-01-20 PROCEDURE — P9047 ALBUMIN (HUMAN), 25%, 50ML: HCPCS

## 2025-01-20 PROCEDURE — 85027 COMPLETE CBC AUTOMATED: CPT

## 2025-01-20 PROCEDURE — 36415 COLL VENOUS BLD VENIPUNCTURE: CPT

## 2025-01-20 PROCEDURE — 85610 PROTHROMBIN TIME: CPT

## 2025-01-20 RX ORDER — ONDANSETRON 2 MG/ML
4 INJECTION INTRAMUSCULAR; INTRAVENOUS ONCE AS NEEDED
Status: ACTIVE | OUTPATIENT
Start: 2025-01-20 | End: 2025-01-20

## 2025-01-20 RX ORDER — ALBUMIN (HUMAN) 12.5 G/50ML
SOLUTION INTRAVENOUS
Status: COMPLETED
Start: 2025-01-20 | End: 2025-01-20

## 2025-01-20 RX ORDER — SODIUM CHLORIDE 9 MG/ML
INJECTION, SOLUTION INTRAVENOUS CONTINUOUS
Status: DISCONTINUED | OUTPATIENT
Start: 2025-01-20 | End: 2025-01-22

## 2025-01-20 RX ADMIN — ALBUMIN (HUMAN) 50 G: 12.5 SOLUTION INTRAVENOUS at 15:42:00

## 2025-01-20 NOTE — PROCEDURES
[facility]   part of [facility]  Procedure Note    Balbina Ramirez Patient Status:  Outpatient    1970 MRN [id number]   Location [facility] ULTRASOUND Attending James Coronel MD    Day # 0 PCP Dakota Bloom MD     Procedure: US paracentesis    Pre-Procedure Diagnosis:  Ascites    Post-Procedure Diagnosis: Same    Anesthesia:  Local    Findings:  5f one step to RLQ    Specimens: As ordered    Blood Loss:  Minimal    Tourniquet Time: none  Complications:  None  Drains:  None    Secondary Diagnosis:  None    Justo Humphreys MD  2025

## 2025-01-20 NOTE — DISCHARGE INSTRUCTIONS
Discharge Instructions for Paracentesis  Paracentesis is a procedure to remove extra fluid from your belly (abdomen). This fluid buildup in the abdomen is called ascites. The procedure may have been done to take a sample of the fluid. Or, it may have been done to drain the extra fluid from your abdomen and help make you more comfortable.      Ascites is buildup of excess fluid in the abdomen.     Home care  If you have pain after the procedure, your healthcare provider can prescribe or recommend pain medicines. Take these exactly as directed. If you stopped taking other medicines before the procedure, ask your provider when you can start them again.  Take it easy for 24 hours after the procedure. Don't do any physical activity until your provider says it’s OK.  You will have a small bandage over the puncture site. Stitches, surgical staples, adhesive tapes, adhesive strips, or surgical glue may be used to close the cut (incision). They also help stop bleeding and speed healing. You may take the bandage off in 24 hours.  Check the puncture site for the signs of infection listed below.    Follow-up care  Make a follow-up appointment with your healthcare provider as directed. During your follow-up visit, your provider will check your healing. Let your provider know how you are feeling. You can also discuss the cause of your ascites and if you need any further treatment. If your fluid is infected, you will be sent home on antibiotics. In some cases, the paracentesis may need to be repeated if the fluid returns. Your provider may also prescribe medicines that increase urination (diuretics) to decrease the buildup of fluid.   When to call your healthcare provider  Call your healthcare provider if any of the following occur:   Fever of 100.4° F ( 38°C) or higher, or as directed by your provider  Chills  Trouble breathing  Pain that doesn't go away even after taking pain medicine  Belly pain not caused by having the skin  punctured  Bleeding from the puncture site  More than a small amount of fluid leaking from the puncture site  Swollen belly  Signs of infection at the puncture site. These include increased pain, redness, or swelling, warmth, or bad-smelling drainage.  Blood in your urine  Feeling dizzy or lightheaded, or fainting  [place] last reviewed this educational content on 5/1/2022  © 0319-5287 [employer]. All rights reserved. This information is not intended as a substitute for professional medical care. Always follow your healthcare professional's instructions.

## 2025-01-20 NOTE — IMAGING NOTE
Patient post paracentesis. 6.5L removed by Dr. Justo Humphreys. Pt with tegaderm dressing to RLQ area, scant bloody drainage. VSS. SBAR called to PENG Stoll RN. Pt in no apparent distress, awaiting transport to room 2253.

## 2025-02-03 RX ORDER — CALCIUM CARBONATE 300MG(750)
1 TABLET,CHEWABLE ORAL DAILY
COMMUNITY

## 2025-02-12 RX ORDER — ALBUMIN (HUMAN) 12.5 G/50ML
50 SOLUTION INTRAVENOUS ONCE
Status: COMPLETED | OUTPATIENT
Start: 2025-02-13 | End: 2025-02-13

## 2025-02-13 ENCOUNTER — NURSE ONLY (OUTPATIENT)
Dept: LAB | Facility: HOSPITAL | Age: 55
End: 2025-02-13
Attending: INTERNAL MEDICINE
Payer: COMMERCIAL

## 2025-02-13 ENCOUNTER — HOSPITAL ENCOUNTER (OUTPATIENT)
Dept: ULTRASOUND IMAGING | Facility: HOSPITAL | Age: 55
Discharge: HOME | End: 2025-02-13
Attending: INTERNAL MEDICINE
Payer: COMMERCIAL

## 2025-02-13 VITALS
RESPIRATION RATE: 18 BRPM | OXYGEN SATURATION: 100 % | DIASTOLIC BLOOD PRESSURE: 76 MMHG | HEART RATE: 88 BPM | TEMPERATURE: 100 F | SYSTOLIC BLOOD PRESSURE: 103 MMHG | BODY MASS INDEX: 41.04 KG/M2 | WEIGHT: 223 LBS | HEIGHT: 62 IN

## 2025-02-13 DIAGNOSIS — K74.60 CIRRHOSIS OF LIVER WITH ASCITES, UNSPECIFIED HEPATIC CIRRHOSIS TYPE (HCC): Primary | ICD-10-CM

## 2025-02-13 DIAGNOSIS — R18.8 CIRRHOSIS OF LIVER WITH ASCITES, UNSPECIFIED HEPATIC CIRRHOSIS TYPE (HCC): Primary | ICD-10-CM

## 2025-02-13 DIAGNOSIS — R18.8 CIRRHOSIS OF LIVER WITH ASCITES, UNSPECIFIED HEPATIC CIRRHOSIS TYPE (HCC): ICD-10-CM

## 2025-02-13 DIAGNOSIS — K74.60 CIRRHOSIS OF LIVER WITH ASCITES, UNSPECIFIED HEPATIC CIRRHOSIS TYPE (HCC): ICD-10-CM

## 2025-02-13 LAB
INR BLD: 1.74 (ref 0.8–1.2)
PROTHROMBIN TIME: 20.5 SECONDS (ref 11.6–14.8)

## 2025-02-13 PROCEDURE — 49083 ABD PARACENTESIS W/IMAGING: CPT | Performed by: INTERNAL MEDICINE

## 2025-02-13 PROCEDURE — P9047 ALBUMIN (HUMAN), 25%, 50ML: HCPCS

## 2025-02-13 PROCEDURE — 85610 PROTHROMBIN TIME: CPT

## 2025-02-13 PROCEDURE — 36415 COLL VENOUS BLD VENIPUNCTURE: CPT

## 2025-02-13 RX ORDER — SODIUM CHLORIDE 9 MG/ML
INJECTION, SOLUTION INTRAVENOUS CONTINUOUS
Status: DISCONTINUED | OUTPATIENT
Start: 2025-02-13 | End: 2025-02-15

## 2025-02-13 RX ORDER — ACETAMINOPHEN 500 MG
1000 TABLET ORAL ONCE
Status: COMPLETED | OUTPATIENT
Start: 2025-02-13 | End: 2025-02-13

## 2025-02-13 RX ORDER — ALBUMIN (HUMAN) 12.5 G/50ML
SOLUTION INTRAVENOUS
Status: COMPLETED
Start: 2025-02-13 | End: 2025-02-13

## 2025-02-13 RX ADMIN — ALBUMIN (HUMAN) 50 G: 12.5 SOLUTION INTRAVENOUS at 14:47:00

## 2025-02-13 RX ADMIN — ACETAMINOPHEN 1000 MG: 500 MG TABLET ORAL at 15:17:00

## 2025-02-13 NOTE — DISCHARGE INSTRUCTIONS
Discharge Instructions for Paracentesis  Paracentesis is a procedure to remove extra fluid from your belly (abdomen). This fluid buildup in the abdomen is called ascites. The procedure may have been done to take a sample of the fluid. Or, it may have been done to drain the extra fluid from your abdomen and help make you more comfortable.      Ascites is buildup of excess fluid in the abdomen.     Home care  If you have pain after the procedure, your healthcare provider can prescribe or recommend pain medicines. Take these exactly as directed. If you stopped taking other medicines before the procedure, ask your provider when you can start them again.  Take it easy for 24 hours after the procedure. Don't do any physical activity until your provider says it’s OK.  You will have a small bandage over the puncture site. Stitches, surgical staples, adhesive tapes, adhesive strips, or surgical glue may be used to close the cut (incision). They also help stop bleeding and speed healing. You may take the bandage off in 24 hours.  Check the puncture site for the signs of infection listed below.    Follow-up care  Make a follow-up appointment with your healthcare provider as directed. During your follow-up visit, your provider will check your healing. Let your provider know how you are feeling. You can also discuss the cause of your ascites and if you need any further treatment. If your fluid is infected, you will be sent home on antibiotics. In some cases, the paracentesis may need to be repeated if the fluid returns. Your provider may also prescribe medicines that increase urination (diuretics) to decrease the buildup of fluid.   When to call your healthcare provider  Call your healthcare provider if any of the following occur:   Fever of 100.4° F ( 38°C) or higher, or as directed by your provider  Chills  Trouble breathing  Pain that doesn't go away even after taking pain medicine  Belly pain not caused by having the skin  punctured  Bleeding from the puncture site  More than a small amount of fluid leaking from the puncture site  Swollen belly  Signs of infection at the puncture site. These include increased pain, redness, or swelling, warmth, or bad-smelling drainage.  Blood in your urine  Feeling dizzy or lightheaded, or fainting  [place]lesia last reviewed this educational content on 5/1/2022  © 7326-6243 [employer]. All rights reserved. This information is not intended as a substitute for professional medical care. Always follow your healthcare professional's instructions.

## 2025-02-13 NOTE — IMAGING NOTE
Patient received in holding.   All pertinent labs reviewed.  NPO since 0000  6800 cc removed by DR Cowan  Puncture site to RLQ with tegaderm dressing in place, C/D/I.  Patient denies pain.   Report given to Bisi POWERS.   Patient transported to [facility] accompanied by [employer] tech.

## 2025-02-13 NOTE — PROCEDURES
PROCEDURE: US PARACENTESIS W IMAGING  (CPT=49083)    COMPARISON: [facility], US PARACENTESIS W IMAGING (CPT=49083), 1/20/2025, 2:02 PM.    INDICATIONS: R18.8 Cirrhosis of liver with ascites, unspecified hepatic cirrhosis type (HCC) K74.60 Cirrhosis of liver with ascites, unspecified hepatic cirrhosis type (HCC*    DESCRIPTION OF PROCEDURE: The risks, benefits, and alternatives of the procedure were explained to the patient and informed written and verbal consent was documented in the chart. Time out was performed by the staff. Potential complications including bleeding and infection were discussed with the patient and they related understanding. After obtaining informed consent, an ultrasound-guided paracentesis was performed in the usual sterile manner. The right lower quadrant abdominal wall was prepped and draped in sterile fashion and 1% lidocaine was used for local anesthetic.    LOCATION: Right lower quadrant  FLUID REMOVED: 6.8 L of straw-colored milky ascites.  MEDICATION: 10 cc of 1% Lidocaine for local anesthetic.  COMPLICATIONS: None.    CONCLUSION: Ultrasound-guided paracentesis was performed without complication.

## 2025-02-15 RX ORDER — LEVOTHYROXINE SODIUM 112 UG/1
TABLET ORAL
Qty: 90 TABLET | Refills: 3 | OUTPATIENT
Start: 2025-02-15

## 2025-02-19 ENCOUNTER — TELEPHONE (OUTPATIENT)
Dept: INTERNAL MEDICINE CLINIC | Facility: CLINIC | Age: 55
End: 2025-02-19

## 2025-02-19 DIAGNOSIS — E03.9 HYPOTHYROIDISM, UNSPECIFIED TYPE: Primary | ICD-10-CM

## 2025-02-19 NOTE — TELEPHONE ENCOUNTER
TO: Pended labs per request in order for patient to receive refill of levothyroxine. Please sign if you agree, TY

## 2025-02-19 NOTE — TELEPHONE ENCOUNTER
Pt will be coming in to get Hep B vaccine.    Future Appointments   Date Time Provider Department Center   3/3/2025 10:15 AM James Coronel MD [facility] Surg[facility]   3/3/2025  1:30 PM [facility] 08 NURSE [facility] [place]   4/16/2025  4:15 PM Linda Blackwell MD [facility] [address]

## 2025-02-19 NOTE — TELEPHONE ENCOUNTER
Pt called requesting refill on levothyroxine 112 MCG Oral Tab.    Made pt aware that she is overdue for labs.    Would like labs ordered so she may be prescribed levothyroxine once again.    LOV 12/10/2024    Preferred call back # [phone]

## 2025-02-20 NOTE — TELEPHONE ENCOUNTER
TO: Pended refill for levothyroxine. Patient to have labs done today. Please sign if you agree. TY

## 2025-02-20 NOTE — TELEPHONE ENCOUNTER
Pt called back and she was informed that her labs have been sent to [facility]. Pt says that [facility] may not have it and requested for this to be mailed to her address on file. This has been printed and placed in front for mail    Pt states she will still need a urgent refill of her 'levothyroxine' since she is nearly out.    levothyroxine 112 MCG Oral Tab 90 tablet     [id number] [place] [address] [phone], [phone] [[id number]]

## 2025-02-22 ENCOUNTER — LAB ENCOUNTER (OUTPATIENT)
Dept: LAB | Age: 55
End: 2025-02-22
Attending: FAMILY MEDICINE
Payer: COMMERCIAL

## 2025-02-22 DIAGNOSIS — K74.69 FLORID CIRRHOSIS (HCC): ICD-10-CM

## 2025-02-22 DIAGNOSIS — K75.81 NONALCOHOLIC STEATOHEPATITIS: ICD-10-CM

## 2025-02-22 DIAGNOSIS — Z01.818 PREOP EXAMINATION: Primary | ICD-10-CM

## 2025-02-22 LAB
ALBUMIN SERPL-MCNC: 3.2 G/DL (ref 3.2–4.8)
ALBUMIN/GLOB SERPL: 1.3 {RATIO} (ref 1–2)
ALP LIVER SERPL-CCNC: 68 U/L
ALT SERPL-CCNC: 19 U/L
ANION GAP SERPL CALC-SCNC: 10 MMOL/L (ref 0–18)
AST SERPL-CCNC: 31 U/L (ref ?–34)
BASOPHILS # BLD AUTO: 0.03 X10(3) UL (ref 0–0.2)
BASOPHILS NFR BLD AUTO: 0.9 %
BILIRUB SERPL-MCNC: 4.1 MG/DL (ref 0.3–1.2)
BUN BLD-MCNC: 10 MG/DL (ref 9–23)
CALCIUM BLD-MCNC: 8.6 MG/DL (ref 8.7–10.6)
CHLORIDE SERPL-SCNC: 100 MMOL/L (ref 98–112)
CO2 SERPL-SCNC: 26 MMOL/L (ref 21–32)
CREAT BLD-MCNC: 0.56 MG/DL
EGFRCR SERPLBLD CKD-EPI 2021: 107 ML/MIN/1.73M2 (ref 60–?)
EOSINOPHIL # BLD AUTO: 0.08 X10(3) UL (ref 0–0.7)
EOSINOPHIL NFR BLD AUTO: 2.4 %
ERYTHROCYTE [DISTWIDTH] IN BLOOD BY AUTOMATED COUNT: 14.6 %
FASTING STATUS PATIENT QL REPORTED: YES
GLOBULIN PLAS-MCNC: 2.4 G/DL (ref 2–3.5)
GLUCOSE BLD-MCNC: 100 MG/DL (ref 70–99)
HCT VFR BLD AUTO: 39.1 %
HGB BLD-MCNC: 13.5 G/DL
IMM GRANULOCYTES # BLD AUTO: 0.01 X10(3) UL (ref 0–1)
IMM GRANULOCYTES NFR BLD: 0.3 %
INR BLD: 1.75 (ref 0.8–1.2)
LYMPHOCYTES # BLD AUTO: 0.9 X10(3) UL (ref 1–4)
LYMPHOCYTES NFR BLD AUTO: 27.5 %
MCH RBC QN AUTO: 33.1 PG (ref 26–34)
MCHC RBC AUTO-ENTMCNC: 34.5 G/DL (ref 31–37)
MCV RBC AUTO: 95.8 FL
MONOCYTES # BLD AUTO: 0.49 X10(3) UL (ref 0.1–1)
MONOCYTES NFR BLD AUTO: 15 %
NEUTROPHILS # BLD AUTO: 1.76 X10 (3) UL (ref 1.5–7.7)
NEUTROPHILS # BLD AUTO: 1.76 X10(3) UL (ref 1.5–7.7)
NEUTROPHILS NFR BLD AUTO: 53.9 %
OSMOLALITY SERPL CALC.SUM OF ELEC: 281 MOSM/KG (ref 275–295)
PLATELET # BLD AUTO: 86 10(3)UL (ref 150–450)
PLATELETS.RETICULATED NFR BLD AUTO: 3.7 % (ref 0–7)
POTASSIUM SERPL-SCNC: 3.8 MMOL/L (ref 3.5–5.1)
PROT SERPL-MCNC: 5.6 G/DL (ref 5.7–8.2)
PROTHROMBIN TIME: 20.6 SECONDS (ref 11.6–14.8)
RBC # BLD AUTO: 4.08 X10(6)UL
SODIUM SERPL-SCNC: 136 MMOL/L (ref 136–145)
T4 FREE SERPL-MCNC: 1.2 NG/DL (ref 0.8–1.7)
TSI SER-ACNC: 6.6 UIU/ML (ref 0.55–4.78)
WBC # BLD AUTO: 3.3 X10(3) UL (ref 4–11)

## 2025-02-22 PROCEDURE — 36415 COLL VENOUS BLD VENIPUNCTURE: CPT

## 2025-02-22 PROCEDURE — 80053 COMPREHEN METABOLIC PANEL: CPT

## 2025-02-22 PROCEDURE — 84439 ASSAY OF FREE THYROXINE: CPT | Performed by: FAMILY MEDICINE

## 2025-02-22 PROCEDURE — 84443 ASSAY THYROID STIM HORMONE: CPT | Performed by: FAMILY MEDICINE

## 2025-02-22 PROCEDURE — 85025 COMPLETE CBC W/AUTO DIFF WBC: CPT

## 2025-02-22 PROCEDURE — 85610 PROTHROMBIN TIME: CPT

## 2025-02-23 RX ORDER — LEVOTHYROXINE SODIUM 125 UG/1
125 TABLET ORAL
Qty: 90 TABLET | Refills: 0 | Status: SHIPPED | OUTPATIENT
Start: 2025-02-23

## 2025-02-24 NOTE — TELEPHONE ENCOUNTER
Left message on voicemail and sent MCM, patient mychart active.     Dakota Bloom MD to [facility] Clinical Staff  Regarding results: 2       2/23/25 11:21 AM  Result Note  I'd bump up the synthroid to 125mcg daily #90    mira freeT4 and TSH then

## 2025-02-25 ENCOUNTER — TELEPHONE (OUTPATIENT)
Dept: INTERNAL MEDICINE CLINIC | Facility: CLINIC | Age: 55
End: 2025-02-25

## 2025-02-26 RX ORDER — ALBUMIN (HUMAN) 12.5 G/50ML
50 SOLUTION INTRAVENOUS ONCE
Status: COMPLETED | OUTPATIENT
Start: 2025-02-27 | End: 2025-02-27

## 2025-02-27 ENCOUNTER — NURSE ONLY (OUTPATIENT)
Dept: LAB | Facility: HOSPITAL | Age: 55
End: 2025-02-27
Attending: INTERNAL MEDICINE
Payer: COMMERCIAL

## 2025-02-27 ENCOUNTER — HOSPITAL ENCOUNTER (OUTPATIENT)
Dept: ULTRASOUND IMAGING | Facility: HOSPITAL | Age: 55
Discharge: HOME | End: 2025-02-27
Attending: INTERNAL MEDICINE
Payer: COMMERCIAL

## 2025-02-27 VITALS
DIASTOLIC BLOOD PRESSURE: 51 MMHG | WEIGHT: 220 LBS | BODY MASS INDEX: 40.48 KG/M2 | OXYGEN SATURATION: 100 % | RESPIRATION RATE: 16 BRPM | SYSTOLIC BLOOD PRESSURE: 113 MMHG | HEART RATE: 86 BPM | TEMPERATURE: 98 F | HEIGHT: 62 IN

## 2025-02-27 DIAGNOSIS — K74.60 CIRRHOSIS OF LIVER WITH ASCITES, UNSPECIFIED HEPATIC CIRRHOSIS TYPE (HCC): ICD-10-CM

## 2025-02-27 DIAGNOSIS — R18.8 CIRRHOSIS OF LIVER WITH ASCITES, UNSPECIFIED HEPATIC CIRRHOSIS TYPE (HCC): ICD-10-CM

## 2025-02-27 DIAGNOSIS — K74.60 CIRRHOSIS OF LIVER WITH ASCITES, UNSPECIFIED HEPATIC CIRRHOSIS TYPE (HCC): Primary | ICD-10-CM

## 2025-02-27 DIAGNOSIS — R18.8 CIRRHOSIS OF LIVER WITH ASCITES, UNSPECIFIED HEPATIC CIRRHOSIS TYPE (HCC): Primary | ICD-10-CM

## 2025-02-27 LAB
INR BLD: 1.73 (ref 0.8–1.2)
PROTHROMBIN TIME: 20.4 SECONDS (ref 11.6–14.8)

## 2025-02-27 PROCEDURE — 85610 PROTHROMBIN TIME: CPT

## 2025-02-27 PROCEDURE — P9047 ALBUMIN (HUMAN), 25%, 50ML: HCPCS

## 2025-02-27 PROCEDURE — 49083 ABD PARACENTESIS W/IMAGING: CPT | Performed by: INTERNAL MEDICINE

## 2025-02-27 PROCEDURE — 36415 COLL VENOUS BLD VENIPUNCTURE: CPT

## 2025-02-27 RX ORDER — SODIUM CHLORIDE 9 MG/ML
INJECTION, SOLUTION INTRAVENOUS CONTINUOUS
Status: DISCONTINUED | OUTPATIENT
Start: 2025-02-27 | End: 2025-03-01

## 2025-02-27 RX ORDER — ACETAMINOPHEN 500 MG
1000 TABLET ORAL EVERY 6 HOURS PRN
Status: DISCONTINUED | OUTPATIENT
Start: 2025-02-27 | End: 2025-03-01

## 2025-02-27 RX ORDER — ALBUMIN (HUMAN) 12.5 G/50ML
SOLUTION INTRAVENOUS
Status: DISCONTINUED
Start: 2025-02-27 | End: 2025-02-28

## 2025-02-27 RX ORDER — ONDANSETRON 2 MG/ML
4 INJECTION INTRAMUSCULAR; INTRAVENOUS ONCE AS NEEDED
Status: ACTIVE | OUTPATIENT
Start: 2025-02-27 | End: 2025-02-27

## 2025-02-27 RX ORDER — ACETAMINOPHEN 500 MG
TABLET ORAL
Status: COMPLETED
Start: 2025-02-27 | End: 2025-02-27

## 2025-02-27 RX ADMIN — ACETAMINOPHEN 1000 MG: 500 MG TABLET ORAL at 15:14:00

## 2025-02-27 RX ADMIN — ALBUMIN (HUMAN) 50 G: 12.5 SOLUTION INTRAVENOUS at 15:02:00

## 2025-02-27 NOTE — IMAGING NOTE
Patient received in holding.   All pertinent labs reviewed.  NPO since 0800  6000 cc removed by DR Rice  Puncture site to [facility] with tegaderm dressing in place, C/D/I.  Patient denies pain.   Report given to Angelica POWERS.   Patient transported to [facility] accompanied by [employer] tech.

## 2025-02-27 NOTE — DISCHARGE INSTRUCTIONS
Home Care [facility] Department of Radiology  Paracentesis    Activity  Take it easy for the rest of the day after your procedure. You may be sore at the site for the next five days. Do not do any strenuous exercises or lift over 5 lbs. for the next 24 hours.    Site Care  Keep a bandage on the site for the next 24 hours.  You may shower after 24 hours, no soaking in a bath tub for 7 days.    Diet  Resume your usual diet.    Pain Management  You may use a covered ice pack for topical pain relief, as needed.    Medications  You may resume your home medications. If you take blood thinners, you may resume them starting the day after your procedure.    When to seek medical advice  Call your ordering provider with questions and to discuss test results.   If you have any questions or concerns about the procedure, please call the Radiology Nurse at [phone] from 8am - 5pm, Monday-Friday. After those hours, Dial [phone] and ask the Radiology Department  to page the on-call Interventional Radiologist if any of these occur:      There is a change in color or temperature of the area where the procedure was performed.  There is more than a small amount of fluid leaking from the puncture site.  You develop increasing pain or shortness of breath.  Unusual drowsiness, weakness, or dizziness gets worse.  Unusual vomiting.    IF YOU FEEL YOU ARE EXPERIENCIG AN EMERGENCY,   CALL 911 OR GO TO THE NEAREST EMERGENCY ROOM.          4.2.24 MO/  Radiology

## 2025-02-28 DIAGNOSIS — E03.9 HYPOTHYROIDISM, UNSPECIFIED TYPE: Primary | ICD-10-CM

## 2025-02-28 RX ORDER — LEVOTHYROXINE SODIUM 125 UG/1
125 TABLET ORAL
Qty: 90 TABLET | Refills: 0 | Status: SHIPPED | OUTPATIENT
Start: 2025-02-28

## 2025-03-03 ENCOUNTER — APPOINTMENT (OUTPATIENT)
Dept: SURGERY | Facility: CLINIC | Age: 55
End: 2025-03-03

## 2025-03-10 PROBLEM — K70.30 ALCOHOLIC CIRRHOSIS OF LIVER WITHOUT ASCITES (HCC): Status: ACTIVE | Noted: 2025-01-27

## 2025-03-12 RX ORDER — ALBUMIN (HUMAN) 12.5 G/50ML
50 SOLUTION INTRAVENOUS ONCE
Status: COMPLETED | OUTPATIENT
Start: 2025-03-13 | End: 2025-03-13

## 2025-03-13 ENCOUNTER — NURSE ONLY (OUTPATIENT)
Dept: LAB | Facility: HOSPITAL | Age: 55
End: 2025-03-13
Attending: INTERNAL MEDICINE
Payer: COMMERCIAL

## 2025-03-13 ENCOUNTER — HOSPITAL ENCOUNTER (OUTPATIENT)
Dept: ULTRASOUND IMAGING | Facility: HOSPITAL | Age: 55
Discharge: HOME | End: 2025-03-13
Attending: INTERNAL MEDICINE
Payer: COMMERCIAL

## 2025-03-13 VITALS
RESPIRATION RATE: 16 BRPM | HEIGHT: 62 IN | WEIGHT: 220 LBS | SYSTOLIC BLOOD PRESSURE: 109 MMHG | BODY MASS INDEX: 40.48 KG/M2 | TEMPERATURE: 98 F | OXYGEN SATURATION: 98 % | DIASTOLIC BLOOD PRESSURE: 56 MMHG | HEART RATE: 93 BPM

## 2025-03-13 DIAGNOSIS — K74.60 CIRRHOSIS (HCC): ICD-10-CM

## 2025-03-13 DIAGNOSIS — R18.8 CIRRHOSIS OF LIVER WITH ASCITES, UNSPECIFIED HEPATIC CIRRHOSIS TYPE (HCC): ICD-10-CM

## 2025-03-13 DIAGNOSIS — K74.60 CIRRHOSIS OF LIVER WITH ASCITES, UNSPECIFIED HEPATIC CIRRHOSIS TYPE (HCC): ICD-10-CM

## 2025-03-13 DIAGNOSIS — K74.60 CIRRHOSIS (HCC): Primary | ICD-10-CM

## 2025-03-13 LAB
INR BLD: 1.64 (ref 0.8–1.2)
PROTHROMBIN TIME: 19.6 SECONDS (ref 11.6–14.8)

## 2025-03-13 PROCEDURE — 85610 PROTHROMBIN TIME: CPT

## 2025-03-13 PROCEDURE — P9047 ALBUMIN (HUMAN), 25%, 50ML: HCPCS

## 2025-03-13 PROCEDURE — 36415 COLL VENOUS BLD VENIPUNCTURE: CPT

## 2025-03-13 PROCEDURE — 49083 ABD PARACENTESIS W/IMAGING: CPT | Performed by: INTERNAL MEDICINE

## 2025-03-13 RX ORDER — ALBUMIN (HUMAN) 12.5 G/50ML
SOLUTION INTRAVENOUS
Status: DISCONTINUED
Start: 2025-03-13 | End: 2025-03-14

## 2025-03-13 RX ORDER — ONDANSETRON 2 MG/ML
4 INJECTION INTRAMUSCULAR; INTRAVENOUS ONCE AS NEEDED
Status: ACTIVE | OUTPATIENT
Start: 2025-03-13 | End: 2025-03-13

## 2025-03-13 RX ADMIN — ALBUMIN (HUMAN) 50 G: 12.5 SOLUTION INTRAVENOUS at 14:49:00

## 2025-03-13 NOTE — IMAGING NOTE
1430- Pt completed paracentesis with Dr. Bahena; 7L of fluid removed.  Dressing to LLQ CDI, no bruising noted.  Vital signs stable.  Pt reporting mild soreness near paracentesis site.  Report called to PRIYA Alvarenga; pt to be transferred to room 2258 accompanied by US technician.  Albumin entered in MAR per orders from Dr. Coronel.

## 2025-03-13 NOTE — DISCHARGE INSTRUCTIONS
Home Care [facility] Department of Radiology  Paracentesis    Activity  Take it easy for the rest of the day after your procedure. You may be sore at the site for the next five days. Do not do any strenuous exercises or lift over 5 lbs. for the next 24 hours.    Site Care  Keep a bandage on the site for the next 24 hours.  You may shower after 24 hours, no soaking in a bath tub for 7 days.    Diet  Resume your usual diet.    Pain Management  You may use a covered ice pack for topical pain relief, as needed.    Medications  You may resume your home medications. If you take blood thinners, you may resume them starting the day after your procedure.    When to seek medical advice  Call your ordering provider with questions and to discuss test results.   If you have any questions or concerns about the procedure, please call the Radiology Nurse at [phone] from 8am - 5pm, Monday-Friday. After those hours, Dial [phone] and ask the Radiology Department  to page the on-call Interventional Radiologist if any of these occur:      There is a change in color or temperature of the area where the procedure was performed.  There is more than a small amount of fluid leaking from the puncture site.  You develop increasing pain or shortness of breath.  Unusual drowsiness, weakness, or dizziness gets worse.  Unusual vomiting.    IF YOU FEEL YOU ARE EXPERIENCIG AN EMERGENCY,   CALL 911 OR GO TO THE NEAREST EMERGENCY ROOM.          4.2.24 MO/  Radiology

## 2025-03-13 NOTE — PROCEDURES
[facility]  Pre-Procedure Note    Name: Balbina Ramirez  MRN#: [id number]  : 1970    Procedure:  Ultrasound Guided Paracentesis    Indication: Ascites, Cirrhosis    Allergies:    Allergies[1]    Pertinent Medications:    Is patient on any Aspirin, Coumadin, or any other Anticoagulations/Antiplatelet medications?  no      Mental Status:  Alert and Oriented      Health Status: Acceptable for Procedure    Impression and Plans:    Symptomatic ascites for ultrasound guided paracentesis.    I have reviewed the above information prior to procedure.    I have discussed the risks and benefits and alternatives with the patient.  The patient understands and agrees to proceed with plan of care.    Joel Bahena MD           [1]   Allergies  Allergen Reactions    Neosporin [Neomycin-Bacitracin-Polymyxin] SWELLING     Ointment    Augmentin [Amoxicillin-Pot Clavulanate] RASH

## 2025-03-21 ENCOUNTER — NURSE ONLY (OUTPATIENT)
Dept: INTERNAL MEDICINE CLINIC | Facility: CLINIC | Age: 55
End: 2025-03-21
Payer: COMMERCIAL

## 2025-03-21 ENCOUNTER — LAB ENCOUNTER (OUTPATIENT)
Dept: LAB | Age: 55
End: 2025-03-21
Attending: INTERNAL MEDICINE
Payer: COMMERCIAL

## 2025-03-21 DIAGNOSIS — K74.69 FLORID CIRRHOSIS (HCC): ICD-10-CM

## 2025-03-21 DIAGNOSIS — K75.81 NONALCOHOLIC STEATOHEPATITIS: ICD-10-CM

## 2025-03-21 DIAGNOSIS — Z01.818 PREOP EXAMINATION: ICD-10-CM

## 2025-03-21 LAB
ALBUMIN SERPL-MCNC: 3.3 G/DL (ref 3.2–4.8)
ALBUMIN/GLOB SERPL: 1.2 {RATIO} (ref 1–2)
ALP LIVER SERPL-CCNC: 71 U/L
ALT SERPL-CCNC: 15 U/L
ANION GAP SERPL CALC-SCNC: 18 MMOL/L (ref 0–18)
AST SERPL-CCNC: 31 U/L (ref ?–34)
BASOPHILS # BLD AUTO: 0.03 X10(3) UL (ref 0–0.2)
BASOPHILS NFR BLD AUTO: 0.8 %
BILIRUB SERPL-MCNC: 4.7 MG/DL (ref 0.3–1.2)
BUN BLD-MCNC: 10 MG/DL (ref 9–23)
CALCIUM BLD-MCNC: 8.4 MG/DL (ref 8.7–10.6)
CHLORIDE SERPL-SCNC: 100 MMOL/L (ref 98–112)
CO2 SERPL-SCNC: 18 MMOL/L (ref 21–32)
CREAT BLD-MCNC: 0.54 MG/DL
EGFRCR SERPLBLD CKD-EPI 2021: 108 ML/MIN/1.73M2 (ref 60–?)
EOSINOPHIL # BLD AUTO: 0.05 X10(3) UL (ref 0–0.7)
EOSINOPHIL NFR BLD AUTO: 1.4 %
ERYTHROCYTE [DISTWIDTH] IN BLOOD BY AUTOMATED COUNT: 14.5 %
FASTING STATUS PATIENT QL REPORTED: YES
GLOBULIN PLAS-MCNC: 2.8 G/DL (ref 2–3.5)
GLUCOSE BLD-MCNC: 96 MG/DL (ref 70–99)
HCT VFR BLD AUTO: 40.9 %
HGB BLD-MCNC: 14.1 G/DL
IMM GRANULOCYTES # BLD AUTO: 0.01 X10(3) UL (ref 0–1)
IMM GRANULOCYTES NFR BLD: 0.3 %
INR BLD: 1.75 (ref 0.8–1.2)
LYMPHOCYTES # BLD AUTO: 0.72 X10(3) UL (ref 1–4)
LYMPHOCYTES NFR BLD AUTO: 20.3 %
MCH RBC QN AUTO: 33.5 PG (ref 26–34)
MCHC RBC AUTO-ENTMCNC: 34.5 G/DL (ref 31–37)
MCV RBC AUTO: 97.1 FL
MONOCYTES # BLD AUTO: 0.38 X10(3) UL (ref 0.1–1)
MONOCYTES NFR BLD AUTO: 10.7 %
NEUTROPHILS # BLD AUTO: 2.36 X10 (3) UL (ref 1.5–7.7)
NEUTROPHILS # BLD AUTO: 2.36 X10(3) UL (ref 1.5–7.7)
NEUTROPHILS NFR BLD AUTO: 66.5 %
OSMOLALITY SERPL CALC.SUM OF ELEC: 281 MOSM/KG (ref 275–295)
PLATELET # BLD AUTO: 91 10(3)UL (ref 150–450)
PLATELETS.RETICULATED NFR BLD AUTO: 4.2 % (ref 0–7)
POTASSIUM SERPL-SCNC: 3.5 MMOL/L (ref 3.5–5.1)
PROT SERPL-MCNC: 6.1 G/DL (ref 5.7–8.2)
PROTHROMBIN TIME: 20.6 SECONDS (ref 11.6–14.8)
RBC # BLD AUTO: 4.21 X10(6)UL
SODIUM SERPL-SCNC: 136 MMOL/L (ref 136–145)
WBC # BLD AUTO: 3.6 X10(3) UL (ref 4–11)

## 2025-03-21 PROCEDURE — 80053 COMPREHEN METABOLIC PANEL: CPT

## 2025-03-21 PROCEDURE — 85610 PROTHROMBIN TIME: CPT

## 2025-03-21 PROCEDURE — 90746 HEPB VACCINE 3 DOSE ADULT IM: CPT | Performed by: FAMILY MEDICINE

## 2025-03-21 PROCEDURE — 85025 COMPLETE CBC W/AUTO DIFF WBC: CPT

## 2025-03-21 PROCEDURE — 90471 IMMUNIZATION ADMIN: CPT | Performed by: FAMILY MEDICINE

## 2025-03-21 PROCEDURE — 36415 COLL VENOUS BLD VENIPUNCTURE: CPT

## 2025-03-26 RX ORDER — ALBUMIN (HUMAN) 12.5 G/50ML
50 SOLUTION INTRAVENOUS ONCE
Status: COMPLETED | OUTPATIENT
Start: 2025-03-27 | End: 2025-03-27

## 2025-03-27 ENCOUNTER — NURSE ONLY (OUTPATIENT)
Dept: LAB | Facility: HOSPITAL | Age: 55
End: 2025-03-27
Attending: INTERNAL MEDICINE
Payer: COMMERCIAL

## 2025-03-27 ENCOUNTER — HOSPITAL ENCOUNTER (OUTPATIENT)
Dept: ULTRASOUND IMAGING | Facility: HOSPITAL | Age: 55
Discharge: HOME | End: 2025-03-27
Attending: INTERNAL MEDICINE
Payer: COMMERCIAL

## 2025-03-27 VITALS
BODY MASS INDEX: 40.48 KG/M2 | TEMPERATURE: 99 F | DIASTOLIC BLOOD PRESSURE: 50 MMHG | SYSTOLIC BLOOD PRESSURE: 103 MMHG | HEART RATE: 88 BPM | HEIGHT: 62 IN | OXYGEN SATURATION: 99 % | WEIGHT: 220 LBS | RESPIRATION RATE: 16 BRPM

## 2025-03-27 DIAGNOSIS — K74.60 CIRRHOSIS (HCC): ICD-10-CM

## 2025-03-27 DIAGNOSIS — R18.8 CIRRHOSIS OF LIVER WITH ASCITES, UNSPECIFIED HEPATIC CIRRHOSIS TYPE (HCC): ICD-10-CM

## 2025-03-27 DIAGNOSIS — K74.60 CIRRHOSIS OF LIVER WITH ASCITES, UNSPECIFIED HEPATIC CIRRHOSIS TYPE (HCC): ICD-10-CM

## 2025-03-27 DIAGNOSIS — K74.60 CIRRHOSIS (HCC): Primary | ICD-10-CM

## 2025-03-27 LAB
INR BLD: 1.67 (ref 0.8–1.2)
PROTHROMBIN TIME: 19.9 SECONDS (ref 11.6–14.8)

## 2025-03-27 PROCEDURE — 85610 PROTHROMBIN TIME: CPT

## 2025-03-27 PROCEDURE — P9047 ALBUMIN (HUMAN), 25%, 50ML: HCPCS

## 2025-03-27 PROCEDURE — 49083 ABD PARACENTESIS W/IMAGING: CPT | Performed by: INTERNAL MEDICINE

## 2025-03-27 PROCEDURE — 36415 COLL VENOUS BLD VENIPUNCTURE: CPT

## 2025-03-27 RX ORDER — ALBUMIN (HUMAN) 12.5 G/50ML
SOLUTION INTRAVENOUS
Status: COMPLETED
Start: 2025-03-27 | End: 2025-03-27

## 2025-03-27 RX ORDER — ONDANSETRON 2 MG/ML
4 INJECTION INTRAMUSCULAR; INTRAVENOUS ONCE AS NEEDED
Status: ACTIVE | OUTPATIENT
Start: 2025-03-27 | End: 2025-03-27

## 2025-03-27 RX ADMIN — ALBUMIN (HUMAN) 50 G: 12.5 SOLUTION INTRAVENOUS at 14:59:00

## 2025-03-27 NOTE — DISCHARGE INSTRUCTIONS
Home Care [facility] Department of Radiology  Paracentesis    Activity  Take it easy for the rest of the day after your procedure. You may be sore at the site for the next five days. Do not do any strenuous exercises or lift over 5 lbs. for the next 24 hours.    Site Care  Keep a bandage on the site for the next 24 hours.  You may shower after 24 hours, no soaking in a bath tub for 7 days.    Diet  Resume your usual diet.    Pain Management  You may use a covered ice pack for topical pain relief, as needed.    Medications  You may resume your home medications. If you take blood thinners, you may resume them starting the day after your procedure.    When to seek medical advice  Call your ordering provider with questions and to discuss test results.   If you have any questions or concerns about the procedure, please call the Radiology Nurse at [phone] from 8am - 5pm, Monday-Friday. After those hours, Dial [phone] and ask the Radiology Department  to page the on-call Interventional Radiologist if any of these occur:      There is a change in color or temperature of the area where the procedure was performed.  There is more than a small amount of fluid leaking from the puncture site.  You develop increasing pain or shortness of breath.  Unusual drowsiness, weakness, or dizziness gets worse.  Unusual vomiting.    IF YOU FEEL YOU ARE EXPERIENCIG AN EMERGENCY,   CALL 911 OR GO TO THE NEAREST EMERGENCY ROOM.          4.2.24 MO/  Radiology

## 2025-03-27 NOTE — IMAGING NOTE
Patient received in holding.   All pertinent labs reviewed.  NPO since 0900  7000 cc removed by DR Rice  Puncture site to [facility] with tegaderm dressing in place, C/D/I.  Patient denies pain.   Report given to Balbina POWERS.   Patient transported to [facility] accompanied by [employer] Tech.

## 2025-04-04 RX ORDER — ALBUMIN (HUMAN) 12.5 G/50ML
50 SOLUTION INTRAVENOUS ONCE
Status: COMPLETED | OUTPATIENT
Start: 2025-04-07 | End: 2025-04-07

## 2025-04-07 ENCOUNTER — HOSPITAL ENCOUNTER (OUTPATIENT)
Dept: ULTRASOUND IMAGING | Facility: HOSPITAL | Age: 55
Discharge: HOME | End: 2025-04-07
Attending: INTERNAL MEDICINE
Payer: COMMERCIAL

## 2025-04-07 ENCOUNTER — NURSE ONLY (OUTPATIENT)
Dept: LAB | Facility: HOSPITAL | Age: 55
End: 2025-04-07
Attending: INTERNAL MEDICINE
Payer: COMMERCIAL

## 2025-04-07 VITALS
RESPIRATION RATE: 16 BRPM | OXYGEN SATURATION: 100 % | WEIGHT: 220 LBS | TEMPERATURE: 98 F | HEART RATE: 96 BPM | SYSTOLIC BLOOD PRESSURE: 111 MMHG | HEIGHT: 62 IN | DIASTOLIC BLOOD PRESSURE: 59 MMHG | BODY MASS INDEX: 40.48 KG/M2

## 2025-04-07 DIAGNOSIS — R18.8 CIRRHOSIS OF LIVER WITH ASCITES, UNSPECIFIED HEPATIC CIRRHOSIS TYPE (HCC): ICD-10-CM

## 2025-04-07 DIAGNOSIS — K74.60 CIRRHOSIS OF LIVER WITH ASCITES, UNSPECIFIED HEPATIC CIRRHOSIS TYPE (HCC): ICD-10-CM

## 2025-04-07 LAB
INR BLD: 1.8 (ref 0.8–1.2)
PROTHROMBIN TIME: 21 SECONDS (ref 11.6–14.8)

## 2025-04-07 PROCEDURE — P9047 ALBUMIN (HUMAN), 25%, 50ML: HCPCS

## 2025-04-07 PROCEDURE — 85610 PROTHROMBIN TIME: CPT | Performed by: INTERNAL MEDICINE

## 2025-04-07 PROCEDURE — 49083 ABD PARACENTESIS W/IMAGING: CPT | Performed by: INTERNAL MEDICINE

## 2025-04-07 RX ORDER — ALBUMIN (HUMAN) 12.5 G/50ML
SOLUTION INTRAVENOUS
Status: COMPLETED
Start: 2025-04-07 | End: 2025-04-07

## 2025-04-07 RX ORDER — ONDANSETRON 2 MG/ML
4 INJECTION INTRAMUSCULAR; INTRAVENOUS ONCE AS NEEDED
Status: ACTIVE | OUTPATIENT
Start: 2025-04-07 | End: 2025-04-07

## 2025-04-07 RX ORDER — SODIUM CHLORIDE 9 MG/ML
INJECTION, SOLUTION INTRAVENOUS CONTINUOUS
Status: DISCONTINUED | OUTPATIENT
Start: 2025-04-07 | End: 2025-04-09

## 2025-04-07 RX ADMIN — ALBUMIN (HUMAN) 50 G: 12.5 SOLUTION INTRAVENOUS at 14:39:00

## 2025-04-07 NOTE — DISCHARGE INSTRUCTIONS
Discharge Instructions for Paracentesis  Paracentesis is a procedure to remove extra fluid from your belly (abdomen). This fluid buildup in the abdomen is called ascites. The procedure may have been done to take a sample of the fluid. Or, it may have been done to drain the extra fluid from your abdomen and help make you more comfortable.      Ascites is buildup of excess fluid in the abdomen.     Home care  If you have pain after the procedure, your healthcare provider can prescribe or recommend pain medicines. Take these exactly as directed. If you stopped taking other medicines before the procedure, ask your provider when you can start them again.  Take it easy for 24 hours after the procedure. Don't do any physical activity until your provider says it’s OK.  You will have a small bandage over the puncture site. Stitches, surgical staples, adhesive tapes, adhesive strips, or surgical glue may be used to close the cut (incision). They also help stop bleeding and speed healing. You may take the bandage off in 24 hours.  Check the puncture site for the signs of infection listed below.    Follow-up care  Make a follow-up appointment with your healthcare provider as directed. During your follow-up visit, your provider will check your healing. Let your provider know how you are feeling. You can also discuss the cause of your ascites and if you need any further treatment. If your fluid is infected, you will be sent home on antibiotics. In some cases, the paracentesis may need to be repeated if the fluid returns. Your provider may also prescribe medicines that increase urination (diuretics) to decrease the buildup of fluid.   When to call your healthcare provider  Call your healthcare provider if any of the following occur:   Fever of 100.4° F ( 38°C) or higher, or as directed by your provider  Chills  Trouble breathing  Pain that doesn't go away even after taking pain medicine  Belly pain not caused by having the skin  punctured  Bleeding from the puncture site  More than a small amount of fluid leaking from the puncture site  Swollen belly  Signs of infection at the puncture site. These include increased pain, redness, or swelling, warmth, or bad-smelling drainage.  Blood in your urine  Feeling dizzy or lightheaded, or fainting  Iglesia last reviewed this educational content on 5/1/2022  © 1488-9733 [employer]. All rights reserved. This information is not intended as a substitute for professional medical care. Always follow your healthcare professional's instructions.

## 2025-04-07 NOTE — IMAGING NOTE
Pt post paracentesis by Dr. Charles.  6.5 liters removed.  Pt with skin glue and tegaderm dressing to LLQ area, CDI.  VSS, SBAR called to PENG Mortensen RN.  Pt in no apparent distress, awaiting transport to room 2260.

## 2025-05-27 ENCOUNTER — TELEPHONE (OUTPATIENT)
Dept: INTERNAL MEDICINE CLINIC | Facility: CLINIC | Age: 55
End: 2025-05-27

## 2025-05-31 ENCOUNTER — LAB ENCOUNTER (OUTPATIENT)
Dept: LAB | Age: 55
End: 2025-05-31
Attending: INTERNAL MEDICINE
Payer: COMMERCIAL

## 2025-05-31 DIAGNOSIS — Z51.81 ENCOUNTER FOR THERAPEUTIC DRUG MONITORING: ICD-10-CM

## 2025-05-31 DIAGNOSIS — D84.9 IMMUNOSUPPRESSION (HCC): Primary | ICD-10-CM

## 2025-05-31 DIAGNOSIS — E83.42 HYPOMAGNESEMIA: ICD-10-CM

## 2025-05-31 DIAGNOSIS — Z94.4 STATUS POST LIVER TRANSPLANT (HCC): ICD-10-CM

## 2025-05-31 LAB
ALBUMIN SERPL-MCNC: 4.7 G/DL (ref 3.2–4.8)
ALBUMIN/GLOB SERPL: 1.8 {RATIO} (ref 1–2)
ALP LIVER SERPL-CCNC: 787 U/L (ref 46–118)
ALT SERPL-CCNC: 27 U/L (ref 10–49)
ANION GAP SERPL CALC-SCNC: 15 MMOL/L (ref 0–18)
AST SERPL-CCNC: 40 U/L (ref ?–34)
BASOPHILS # BLD AUTO: 0.07 X10(3) UL (ref 0–0.2)
BASOPHILS NFR BLD AUTO: 0.8 %
BILIRUB SERPL-MCNC: 1.1 MG/DL (ref 0.3–1.2)
BUN BLD-MCNC: 15 MG/DL (ref 9–23)
CALCIUM BLD-MCNC: 10 MG/DL (ref 8.7–10.6)
CHLORIDE SERPL-SCNC: 97 MMOL/L (ref 98–112)
CO2 SERPL-SCNC: 26 MMOL/L (ref 21–32)
CREAT BLD-MCNC: 0.98 MG/DL (ref 0.55–1.02)
EGFRCR SERPLBLD CKD-EPI 2021: 68 ML/MIN/1.73M2 (ref 60–?)
EOSINOPHIL # BLD AUTO: 0.07 X10(3) UL (ref 0–0.7)
EOSINOPHIL NFR BLD AUTO: 0.8 %
ERYTHROCYTE [DISTWIDTH] IN BLOOD BY AUTOMATED COUNT: 17.8 %
FASTING STATUS PATIENT QL REPORTED: YES
GLOBULIN PLAS-MCNC: 2.6 G/DL (ref 2–3.5)
GLUCOSE BLD-MCNC: 108 MG/DL (ref 70–99)
HCT VFR BLD AUTO: 36.9 % (ref 35–48)
HGB BLD-MCNC: 12.3 G/DL (ref 12–16)
IMM GRANULOCYTES # BLD AUTO: 0.03 X10(3) UL (ref 0–1)
IMM GRANULOCYTES NFR BLD: 0.4 %
LYMPHOCYTES # BLD AUTO: 1.15 X10(3) UL (ref 1–4)
LYMPHOCYTES NFR BLD AUTO: 13.7 %
MAGNESIUM SERPL-MCNC: 1.5 MG/DL (ref 1.6–2.6)
MCH RBC QN AUTO: 30.9 PG (ref 26–34)
MCHC RBC AUTO-ENTMCNC: 33.3 G/DL (ref 31–37)
MCV RBC AUTO: 92.7 FL (ref 80–100)
MONOCYTES # BLD AUTO: 0.67 X10(3) UL (ref 0.1–1)
MONOCYTES NFR BLD AUTO: 8 %
NEUTROPHILS # BLD AUTO: 6.43 X10 (3) UL (ref 1.5–7.7)
NEUTROPHILS # BLD AUTO: 6.43 X10(3) UL (ref 1.5–7.7)
NEUTROPHILS NFR BLD AUTO: 76.3 %
OSMOLALITY SERPL CALC.SUM OF ELEC: 287 MOSM/KG (ref 275–295)
PLATELET # BLD AUTO: 581 10(3)UL (ref 150–450)
POTASSIUM SERPL-SCNC: 3.4 MMOL/L (ref 3.5–5.1)
PROT SERPL-MCNC: 7.3 G/DL (ref 5.7–8.2)
RBC # BLD AUTO: 3.98 X10(6)UL (ref 3.8–5.3)
SODIUM SERPL-SCNC: 138 MMOL/L (ref 136–145)
WBC # BLD AUTO: 8.4 X10(3) UL (ref 4–11)

## 2025-05-31 PROCEDURE — 36415 COLL VENOUS BLD VENIPUNCTURE: CPT

## 2025-05-31 PROCEDURE — 83735 ASSAY OF MAGNESIUM: CPT

## 2025-05-31 PROCEDURE — 80053 COMPREHEN METABOLIC PANEL: CPT

## 2025-05-31 PROCEDURE — 85025 COMPLETE CBC W/AUTO DIFF WBC: CPT

## 2025-05-31 PROCEDURE — 80197 ASSAY OF TACROLIMUS: CPT

## 2025-06-02 ENCOUNTER — APPOINTMENT (OUTPATIENT)
Dept: SURGERY | Facility: CLINIC | Age: 55
End: 2025-06-02

## 2025-06-03 ENCOUNTER — TELEPHONE (OUTPATIENT)
Dept: INTERNAL MEDICINE CLINIC | Facility: CLINIC | Age: 55
End: 2025-06-03

## 2025-06-05 LAB — TACROLIMUS LVL: 7.9 NG/ML

## 2025-06-17 ENCOUNTER — TELEPHONE (OUTPATIENT)
Dept: INTERNAL MEDICINE CLINIC | Facility: CLINIC | Age: 55
End: 2025-06-17

## 2025-06-23 ENCOUNTER — LAB ENCOUNTER (OUTPATIENT)
Dept: LAB | Age: 55
End: 2025-06-23
Attending: INTERNAL MEDICINE
Payer: COMMERCIAL

## 2025-06-23 DIAGNOSIS — K75.81 NONALCOHOLIC STEATOHEPATITIS: ICD-10-CM

## 2025-06-23 DIAGNOSIS — Z01.818 PREOP EXAMINATION: ICD-10-CM

## 2025-06-23 DIAGNOSIS — Z51.81 ENCOUNTER FOR THERAPEUTIC DRUG MONITORING: ICD-10-CM

## 2025-06-23 DIAGNOSIS — K74.69 FLORID CIRRHOSIS (HCC): ICD-10-CM

## 2025-06-23 DIAGNOSIS — E83.42 HYPOMAGNESEMIA: ICD-10-CM

## 2025-06-23 DIAGNOSIS — Z94.4 STATUS POST LIVER TRANSPLANT (HCC): ICD-10-CM

## 2025-06-23 DIAGNOSIS — D84.9 IMMUNOSUPPRESSION (HCC): ICD-10-CM

## 2025-06-23 LAB
ALBUMIN SERPL-MCNC: 4.6 G/DL (ref 3.2–4.8)
ALBUMIN/GLOB SERPL: 1.8 {RATIO} (ref 1–2)
ALP LIVER SERPL-CCNC: 823 U/L (ref 46–118)
ALT SERPL-CCNC: 64 U/L (ref 10–49)
ANION GAP SERPL CALC-SCNC: 15 MMOL/L (ref 0–18)
AST SERPL-CCNC: 77 U/L (ref ?–34)
BASOPHILS # BLD AUTO: 0.09 X10(3) UL (ref 0–0.2)
BASOPHILS NFR BLD AUTO: 2.2 %
BILIRUB SERPL-MCNC: 1.8 MG/DL (ref 0.3–1.2)
BUN BLD-MCNC: 13 MG/DL (ref 9–23)
CALCIUM BLD-MCNC: 10.1 MG/DL (ref 8.7–10.6)
CHLORIDE SERPL-SCNC: 99 MMOL/L (ref 98–112)
CO2 SERPL-SCNC: 24 MMOL/L (ref 21–32)
CREAT BLD-MCNC: 0.77 MG/DL (ref 0.55–1.02)
EGFRCR SERPLBLD CKD-EPI 2021: 90 ML/MIN/1.73M2 (ref 60–?)
EOSINOPHIL # BLD AUTO: 0.04 X10(3) UL (ref 0–0.7)
EOSINOPHIL NFR BLD AUTO: 1 %
ERYTHROCYTE [DISTWIDTH] IN BLOOD BY AUTOMATED COUNT: 19.1 %
FASTING STATUS PATIENT QL REPORTED: YES
GLOBULIN PLAS-MCNC: 2.5 G/DL (ref 2–3.5)
GLUCOSE BLD-MCNC: 100 MG/DL (ref 70–99)
HCT VFR BLD AUTO: 34.9 % (ref 35–48)
HGB BLD-MCNC: 12.2 G/DL (ref 12–16)
IMM GRANULOCYTES # BLD AUTO: 0.18 X10(3) UL (ref 0–1)
IMM GRANULOCYTES NFR BLD: 4.3 %
INR BLD: 1.21 (ref 0.8–1.2)
LYMPHOCYTES # BLD AUTO: 1 X10(3) UL (ref 1–4)
LYMPHOCYTES NFR BLD AUTO: 24 %
MAGNESIUM SERPL-MCNC: 1.8 MG/DL (ref 1.6–2.6)
MCH RBC QN AUTO: 31.4 PG (ref 26–34)
MCHC RBC AUTO-ENTMCNC: 35 G/DL (ref 31–37)
MCV RBC AUTO: 89.7 FL (ref 80–100)
MONOCYTES # BLD AUTO: 0.26 X10(3) UL (ref 0.1–1)
MONOCYTES NFR BLD AUTO: 6.2 %
NEUTROPHILS # BLD AUTO: 2.6 X10 (3) UL (ref 1.5–7.7)
NEUTROPHILS # BLD AUTO: 2.6 X10(3) UL (ref 1.5–7.7)
NEUTROPHILS NFR BLD AUTO: 62.3 %
OSMOLALITY SERPL CALC.SUM OF ELEC: 286 MOSM/KG (ref 275–295)
PLATELET # BLD AUTO: 599 10(3)UL (ref 150–450)
POTASSIUM SERPL-SCNC: 3.8 MMOL/L (ref 3.5–5.1)
PROT SERPL-MCNC: 7.1 G/DL (ref 5.7–8.2)
PROTHROMBIN TIME: 15.5 SECONDS (ref 11.6–14.8)
RBC # BLD AUTO: 3.89 X10(6)UL (ref 3.8–5.3)
SODIUM SERPL-SCNC: 138 MMOL/L (ref 136–145)
WBC # BLD AUTO: 4.2 X10(3) UL (ref 4–11)

## 2025-06-23 PROCEDURE — 36415 COLL VENOUS BLD VENIPUNCTURE: CPT

## 2025-06-23 PROCEDURE — 85610 PROTHROMBIN TIME: CPT

## 2025-06-23 PROCEDURE — 80197 ASSAY OF TACROLIMUS: CPT

## 2025-06-23 PROCEDURE — 85025 COMPLETE CBC W/AUTO DIFF WBC: CPT

## 2025-06-23 PROCEDURE — 83735 ASSAY OF MAGNESIUM: CPT

## 2025-06-23 PROCEDURE — 80053 COMPREHEN METABOLIC PANEL: CPT

## 2025-06-27 LAB — TACROLIMUS LVL: 3.3 NG/ML

## 2025-07-01 ENCOUNTER — LAB ENCOUNTER (OUTPATIENT)
Dept: LAB | Age: 55
End: 2025-07-01
Attending: INTERNAL MEDICINE
Payer: COMMERCIAL

## 2025-07-01 DIAGNOSIS — Z51.81 ENCOUNTER FOR THERAPEUTIC DRUG MONITORING: ICD-10-CM

## 2025-07-01 DIAGNOSIS — D84.9 IMMUNOSUPPRESSION (HCC): ICD-10-CM

## 2025-07-01 DIAGNOSIS — Z94.4 STATUS POST LIVER TRANSPLANT (HCC): ICD-10-CM

## 2025-07-01 DIAGNOSIS — E83.42 HYPOMAGNESEMIA: ICD-10-CM

## 2025-07-01 DIAGNOSIS — Z01.818 PREOP EXAMINATION: ICD-10-CM

## 2025-07-01 DIAGNOSIS — K74.69 FLORID CIRRHOSIS (HCC): ICD-10-CM

## 2025-07-01 DIAGNOSIS — K75.81 NONALCOHOLIC STEATOHEPATITIS: ICD-10-CM

## 2025-07-01 LAB
ALBUMIN SERPL-MCNC: 4.2 G/DL (ref 3.2–4.8)
ALBUMIN/GLOB SERPL: 1.8 {RATIO} (ref 1–2)
ALP LIVER SERPL-CCNC: 666 U/L (ref 46–118)
ALT SERPL-CCNC: 57 U/L (ref 10–49)
ANION GAP SERPL CALC-SCNC: 13 MMOL/L (ref 0–18)
AST SERPL-CCNC: 71 U/L (ref ?–34)
BASOPHILS # BLD: 0 X10(3) UL (ref 0–0.2)
BASOPHILS NFR BLD: 0 %
BILIRUB SERPL-MCNC: 1 MG/DL (ref 0.3–1.2)
BUN BLD-MCNC: 10 MG/DL (ref 9–23)
CALCIUM BLD-MCNC: 9.7 MG/DL (ref 8.7–10.6)
CHLORIDE SERPL-SCNC: 102 MMOL/L (ref 98–112)
CO2 SERPL-SCNC: 26 MMOL/L (ref 21–32)
CREAT BLD-MCNC: 0.62 MG/DL (ref 0.55–1.02)
EGFRCR SERPLBLD CKD-EPI 2021: 104 ML/MIN/1.73M2 (ref 60–?)
EOSINOPHIL # BLD: 0.04 X10(3) UL (ref 0–0.7)
EOSINOPHIL NFR BLD: 1 %
ERYTHROCYTE [DISTWIDTH] IN BLOOD BY AUTOMATED COUNT: 18.2 %
FASTING STATUS PATIENT QL REPORTED: NO
GLOBULIN PLAS-MCNC: 2.4 G/DL (ref 2–3.5)
GLUCOSE BLD-MCNC: 106 MG/DL (ref 70–99)
HCT VFR BLD AUTO: 34.3 % (ref 35–48)
HGB BLD-MCNC: 11.8 G/DL (ref 12–16)
INR BLD: 1.33 (ref 0.8–1.2)
LYMPHOCYTES NFR BLD: 1.07 X10(3) UL (ref 1–4)
LYMPHOCYTES NFR BLD: 26 %
MAGNESIUM SERPL-MCNC: 1.8 MG/DL (ref 1.6–2.6)
MCH RBC QN AUTO: 31.5 PG (ref 26–34)
MCHC RBC AUTO-ENTMCNC: 34.4 G/DL (ref 31–37)
MCV RBC AUTO: 91.5 FL (ref 80–100)
METAMYELOCYTES # BLD: 0.04 X10(3) UL (ref ?–0.01)
METAMYELOCYTES NFR BLD: 1 %
MONOCYTES # BLD: 0.12 X10(3) UL (ref 0.1–1)
MONOCYTES NFR BLD: 3 %
NEUTROPHILS # BLD AUTO: 2.22 X10 (3) UL (ref 1.5–7.7)
NEUTROPHILS NFR BLD: 65 %
NEUTS BAND NFR BLD: 4 %
NEUTS HYPERSEG # BLD: 2.83 X10(3) UL (ref 1.5–7.7)
OSMOLALITY SERPL CALC.SUM OF ELEC: 291 MOSM/KG (ref 275–295)
PLATELET # BLD AUTO: 496 10(3)UL (ref 150–450)
PLATELET MORPHOLOGY: NORMAL
POTASSIUM SERPL-SCNC: 2.9 MMOL/L (ref 3.5–5.1)
PROT SERPL-MCNC: 6.6 G/DL (ref 5.7–8.2)
PROTHROMBIN TIME: 16.6 SECONDS (ref 11.6–14.8)
RBC # BLD AUTO: 3.75 X10(6)UL (ref 3.8–5.3)
SODIUM SERPL-SCNC: 141 MMOL/L (ref 136–145)
TOTAL CELLS COUNTED BLD: 100
WBC # BLD AUTO: 4.1 X10(3) UL (ref 4–11)

## 2025-07-01 PROCEDURE — 36415 COLL VENOUS BLD VENIPUNCTURE: CPT

## 2025-07-01 PROCEDURE — 83735 ASSAY OF MAGNESIUM: CPT

## 2025-07-01 PROCEDURE — 85610 PROTHROMBIN TIME: CPT

## 2025-07-01 PROCEDURE — 80197 ASSAY OF TACROLIMUS: CPT

## 2025-07-01 PROCEDURE — 85027 COMPLETE CBC AUTOMATED: CPT

## 2025-07-01 PROCEDURE — 85025 COMPLETE CBC W/AUTO DIFF WBC: CPT

## 2025-07-01 PROCEDURE — 80053 COMPREHEN METABOLIC PANEL: CPT

## 2025-07-01 PROCEDURE — 85007 BL SMEAR W/DIFF WBC COUNT: CPT

## 2025-07-02 ENCOUNTER — TELEPHONE (OUTPATIENT)
Dept: INTERNAL MEDICINE CLINIC | Facility: CLINIC | Age: 55
End: 2025-07-02

## 2025-07-02 NOTE — TELEPHONE ENCOUNTER
Physician Order #[id number] and Home Health Certification & Plan of Care Order #[id number] faxed to [facility] [employer] (Fax #[phone]).  Signed and dated by Dr. Bloom 7/2/2025.

## 2025-07-03 LAB — TACROLIMUS LVL: 4.8 NG/ML

## 2025-07-07 ENCOUNTER — LAB ENCOUNTER (OUTPATIENT)
Dept: LAB | Age: 55
End: 2025-07-07
Attending: INTERNAL MEDICINE
Payer: COMMERCIAL

## 2025-07-07 ENCOUNTER — APPOINTMENT (OUTPATIENT)
Dept: SURGERY | Facility: CLINIC | Age: 55
End: 2025-07-07

## 2025-07-07 DIAGNOSIS — Z51.81 ENCOUNTER FOR THERAPEUTIC DRUG MONITORING: ICD-10-CM

## 2025-07-07 DIAGNOSIS — E83.42 HYPOMAGNESEMIA: ICD-10-CM

## 2025-07-07 DIAGNOSIS — Z94.4 STATUS POST LIVER TRANSPLANT (HCC): ICD-10-CM

## 2025-07-07 DIAGNOSIS — D84.9 IMMUNOSUPPRESSION (HCC): ICD-10-CM

## 2025-07-07 LAB
ALBUMIN SERPL-MCNC: 4.3 G/DL (ref 3.2–4.8)
ALBUMIN/GLOB SERPL: 1.5 {RATIO} (ref 1–2)
ALP LIVER SERPL-CCNC: 723 U/L (ref 46–118)
ALT SERPL-CCNC: 51 U/L (ref 10–49)
ANION GAP SERPL CALC-SCNC: 9 MMOL/L (ref 0–18)
AST SERPL-CCNC: 54 U/L (ref ?–34)
BASOPHILS # BLD: 0.1 X10(3) UL (ref 0–0.2)
BASOPHILS NFR BLD: 2 %
BILIRUB SERPL-MCNC: 1.6 MG/DL (ref 0.3–1.2)
BUN BLD-MCNC: 9 MG/DL (ref 9–23)
CALCIUM BLD-MCNC: 9.9 MG/DL (ref 8.7–10.6)
CHLORIDE SERPL-SCNC: 99 MMOL/L (ref 98–112)
CO2 SERPL-SCNC: 30 MMOL/L (ref 21–32)
CREAT BLD-MCNC: 0.66 MG/DL (ref 0.55–1.02)
EGFRCR SERPLBLD CKD-EPI 2021: 103 ML/MIN/1.73M2 (ref 60–?)
EOSINOPHIL # BLD: 0.3 X10(3) UL (ref 0–0.7)
EOSINOPHIL NFR BLD: 6 %
ERYTHROCYTE [DISTWIDTH] IN BLOOD BY AUTOMATED COUNT: 18.6 %
FASTING STATUS PATIENT QL REPORTED: YES
GLOBULIN PLAS-MCNC: 2.8 G/DL (ref 2–3.5)
GLUCOSE BLD-MCNC: 93 MG/DL (ref 70–99)
HCT VFR BLD AUTO: 37.8 % (ref 35–48)
HELMET CELLS BLD QL SMEAR: PRESENT
HGB BLD-MCNC: 12.4 G/DL (ref 12–16)
LYMPHOCYTES NFR BLD: 1.15 X10(3) UL (ref 1–4)
LYMPHOCYTES NFR BLD: 23 %
MAGNESIUM SERPL-MCNC: 2 MG/DL (ref 1.6–2.6)
MCH RBC QN AUTO: 31.2 PG (ref 26–34)
MCHC RBC AUTO-ENTMCNC: 32.8 G/DL (ref 31–37)
MCV RBC AUTO: 95.2 FL (ref 80–100)
MONOCYTES # BLD: 0.3 X10(3) UL (ref 0.1–1)
MONOCYTES NFR BLD: 6 %
NEUTROPHILS # BLD AUTO: 2.8 X10 (3) UL (ref 1.5–7.7)
NEUTROPHILS NFR BLD: 59 %
NEUTS BAND NFR BLD: 4 %
NEUTS HYPERSEG # BLD: 3.15 X10(3) UL (ref 1.5–7.7)
OSMOLALITY SERPL CALC.SUM OF ELEC: 284 MOSM/KG (ref 275–295)
PLATELET # BLD AUTO: 487 10(3)UL (ref 150–450)
PLATELET MORPHOLOGY: NORMAL
POTASSIUM SERPL-SCNC: 3.3 MMOL/L (ref 3.5–5.1)
PROT SERPL-MCNC: 7.1 G/DL (ref 5.7–8.2)
RBC # BLD AUTO: 3.97 X10(6)UL (ref 3.8–5.3)
SODIUM SERPL-SCNC: 138 MMOL/L (ref 136–145)
TOTAL CELLS COUNTED BLD: 100
WBC # BLD AUTO: 5 X10(3) UL (ref 4–11)

## 2025-07-07 PROCEDURE — 36415 COLL VENOUS BLD VENIPUNCTURE: CPT

## 2025-07-07 PROCEDURE — 85027 COMPLETE CBC AUTOMATED: CPT

## 2025-07-07 PROCEDURE — 80197 ASSAY OF TACROLIMUS: CPT

## 2025-07-07 PROCEDURE — 85025 COMPLETE CBC W/AUTO DIFF WBC: CPT

## 2025-07-07 PROCEDURE — 83735 ASSAY OF MAGNESIUM: CPT

## 2025-07-07 PROCEDURE — 85007 BL SMEAR W/DIFF WBC COUNT: CPT

## 2025-07-07 PROCEDURE — 80053 COMPREHEN METABOLIC PANEL: CPT

## 2025-07-09 LAB — TACROLIMUS LVL: 2.2 NG/ML

## 2025-07-14 ENCOUNTER — RESULTS FOLLOW-UP (OUTPATIENT)
Dept: INTERNAL MEDICINE CLINIC | Facility: CLINIC | Age: 55
End: 2025-07-14

## 2025-07-14 ENCOUNTER — LAB ENCOUNTER (OUTPATIENT)
Dept: LAB | Age: 55
End: 2025-07-14
Attending: INTERNAL MEDICINE
Payer: COMMERCIAL

## 2025-07-14 DIAGNOSIS — D84.9 IMMUNOSUPPRESSION (HCC): ICD-10-CM

## 2025-07-14 DIAGNOSIS — Z51.81 ENCOUNTER FOR THERAPEUTIC DRUG MONITORING: ICD-10-CM

## 2025-07-14 DIAGNOSIS — E03.9 HYPOTHYROIDISM, UNSPECIFIED TYPE: Primary | ICD-10-CM

## 2025-07-14 DIAGNOSIS — E03.9 HYPOTHYROIDISM, UNSPECIFIED TYPE: ICD-10-CM

## 2025-07-14 DIAGNOSIS — E83.42 HYPOMAGNESEMIA: ICD-10-CM

## 2025-07-14 DIAGNOSIS — Z94.4 STATUS POST LIVER TRANSPLANT (HCC): ICD-10-CM

## 2025-07-14 LAB
ALBUMIN SERPL-MCNC: 4.4 G/DL (ref 3.2–4.8)
ALBUMIN/GLOB SERPL: 1.8 {RATIO} (ref 1–2)
ALP LIVER SERPL-CCNC: 852 U/L (ref 46–118)
ALT SERPL-CCNC: 44 U/L (ref 10–49)
ANION GAP SERPL CALC-SCNC: 12 MMOL/L (ref 0–18)
AST SERPL-CCNC: 52 U/L (ref ?–34)
BASOPHILS # BLD: 0.14 X10(3) UL (ref 0–0.2)
BASOPHILS NFR BLD: 3 %
BILIRUB SERPL-MCNC: 1.7 MG/DL (ref 0.3–1.2)
BUN BLD-MCNC: 13 MG/DL (ref 9–23)
CALCIUM BLD-MCNC: 10 MG/DL (ref 8.7–10.6)
CHLORIDE SERPL-SCNC: 99 MMOL/L (ref 98–112)
CO2 SERPL-SCNC: 28 MMOL/L (ref 21–32)
CREAT BLD-MCNC: 0.77 MG/DL (ref 0.55–1.02)
EGFRCR SERPLBLD CKD-EPI 2021: 90 ML/MIN/1.73M2 (ref 60–?)
EOSINOPHIL # BLD: 0 X10(3) UL (ref 0–0.7)
EOSINOPHIL NFR BLD: 0 %
ERYTHROCYTE [DISTWIDTH] IN BLOOD BY AUTOMATED COUNT: 16.2 %
FASTING STATUS PATIENT QL REPORTED: NO
GLOBULIN PLAS-MCNC: 2.5 G/DL (ref 2–3.5)
GLUCOSE BLD-MCNC: 117 MG/DL (ref 70–99)
HCT VFR BLD AUTO: 36.4 % (ref 35–48)
HELMET CELLS BLD QL SMEAR: PRESENT
HGB BLD-MCNC: 12.7 G/DL (ref 12–16)
LYMPHOCYTES NFR BLD: 0.92 X10(3) UL (ref 1–4)
LYMPHOCYTES NFR BLD: 20 %
MAGNESIUM SERPL-MCNC: 1.8 MG/DL (ref 1.6–2.6)
MCH RBC QN AUTO: 31.6 PG (ref 26–34)
MCHC RBC AUTO-ENTMCNC: 34.9 G/DL (ref 31–37)
MCV RBC AUTO: 90.5 FL (ref 80–100)
METAMYELOCYTES # BLD: 0.09 X10(3) UL (ref ?–0.01)
METAMYELOCYTES NFR BLD: 2 %
MONOCYTES # BLD: 0.37 X10(3) UL (ref 0.1–1)
MONOCYTES NFR BLD: 8 %
NEUTROPHILS # BLD AUTO: 2.46 X10 (3) UL (ref 1.5–7.7)
NEUTROPHILS NFR BLD: 64 %
NEUTS BAND NFR BLD: 3 %
NEUTS HYPERSEG # BLD: 3.08 X10(3) UL (ref 1.5–7.7)
OSMOLALITY SERPL CALC.SUM OF ELEC: 289 MOSM/KG (ref 275–295)
PLATELET # BLD AUTO: 574 10(3)UL (ref 150–450)
POTASSIUM SERPL-SCNC: 3.5 MMOL/L (ref 3.5–5.1)
PROT SERPL-MCNC: 6.9 G/DL (ref 5.7–8.2)
RBC # BLD AUTO: 4.02 X10(6)UL (ref 3.8–5.3)
SODIUM SERPL-SCNC: 139 MMOL/L (ref 136–145)
T4 FREE SERPL-MCNC: 2.1 NG/DL (ref 0.8–1.7)
TOTAL CELLS COUNTED BLD: 100
TSI SER-ACNC: 1.03 UIU/ML (ref 0.55–4.78)
WBC # BLD AUTO: 4.6 X10(3) UL (ref 4–11)

## 2025-07-14 PROCEDURE — 84443 ASSAY THYROID STIM HORMONE: CPT | Performed by: FAMILY MEDICINE

## 2025-07-14 PROCEDURE — 84439 ASSAY OF FREE THYROXINE: CPT | Performed by: FAMILY MEDICINE

## 2025-07-17 LAB — TACROLIMUS LVL: 5.7 NG/ML

## 2025-07-28 RX ORDER — LEVOTHYROXINE SODIUM 112 UG/1
112 TABLET ORAL
Qty: 90 TABLET | Refills: 0 | Status: SHIPPED | OUTPATIENT
Start: 2025-07-28

## 2025-07-28 RX ORDER — LEVOTHYROXINE SODIUM 112 UG/1
112 TABLET ORAL
Qty: 90 TABLET | Refills: 0 | Status: CANCELLED | OUTPATIENT
Start: 2025-07-28

## 2025-07-28 NOTE — TELEPHONE ENCOUNTER
----- Message from Dakota Bloom sent at 7/14/2025  5:44 PM CDT -----  Now we seem to strong on her thyroid dose    rec lower to 112mcg daily $90   mira freeT4 and TSH then    ----- Message -----  From: Lab, Background User  Sent: 7/14/2025  11:48 AM CDT  To: Dakota Bloom MD

## 2025-07-29 ENCOUNTER — LAB ENCOUNTER (OUTPATIENT)
Dept: LAB | Age: 55
End: 2025-07-29
Attending: INTERNAL MEDICINE

## 2025-07-29 DIAGNOSIS — K74.69 FLORID CIRRHOSIS (HCC): ICD-10-CM

## 2025-07-29 DIAGNOSIS — E83.42 HYPOMAGNESEMIA: ICD-10-CM

## 2025-07-29 DIAGNOSIS — D84.9 IMMUNOSUPPRESSION (HCC): ICD-10-CM

## 2025-07-29 DIAGNOSIS — Z01.818 PREOP EXAMINATION: ICD-10-CM

## 2025-07-29 DIAGNOSIS — Z94.4 STATUS POST LIVER TRANSPLANT (HCC): ICD-10-CM

## 2025-07-29 DIAGNOSIS — K75.81 NONALCOHOLIC STEATOHEPATITIS: ICD-10-CM

## 2025-07-29 DIAGNOSIS — Z51.81 ENCOUNTER FOR THERAPEUTIC DRUG MONITORING: ICD-10-CM

## 2025-07-29 LAB
ALBUMIN SERPL-MCNC: 4.6 G/DL (ref 3.2–4.8)
ALBUMIN/GLOB SERPL: 1.8 (ref 1–2)
ALP LIVER SERPL-CCNC: 760 U/L (ref 46–118)
ALT SERPL-CCNC: 45 U/L (ref 10–49)
ANION GAP SERPL CALC-SCNC: 12 MMOL/L (ref 0–18)
AST SERPL-CCNC: 56 U/L (ref ?–34)
BASOPHILS # BLD: 0 X10(3) UL (ref 0–0.2)
BASOPHILS NFR BLD: 0 %
BILIRUB SERPL-MCNC: 1.2 MG/DL (ref 0.3–1.2)
BUN BLD-MCNC: 17 MG/DL (ref 9–23)
CALCIUM BLD-MCNC: 10.3 MG/DL (ref 8.7–10.6)
CHLORIDE SERPL-SCNC: 100 MMOL/L (ref 98–112)
CO2 SERPL-SCNC: 27 MMOL/L (ref 21–32)
CREAT BLD-MCNC: 1.07 MG/DL (ref 0.55–1.02)
EGFRCR SERPLBLD CKD-EPI 2021: 61 ML/MIN/1.73M2 (ref 60–?)
EOSINOPHIL # BLD: 0.16 X10(3) UL (ref 0–0.7)
EOSINOPHIL NFR BLD: 4 %
ERYTHROCYTE [DISTWIDTH] IN BLOOD BY AUTOMATED COUNT: 14.6 %
FASTING STATUS PATIENT QL REPORTED: YES
GLOBULIN PLAS-MCNC: 2.6 G/DL (ref 2–3.5)
GLUCOSE BLD-MCNC: 112 MG/DL (ref 70–99)
HCT VFR BLD AUTO: 39.9 % (ref 35–48)
HGB BLD-MCNC: 13.3 G/DL (ref 12–16)
INR BLD: 1.1 (ref 0.8–1.2)
LYMPHOCYTES NFR BLD: 1.12 X10(3) UL (ref 1–4)
LYMPHOCYTES NFR BLD: 28 %
MAGNESIUM SERPL-MCNC: 1.7 MG/DL (ref 1.6–2.6)
MCH RBC QN AUTO: 30.9 PG (ref 26–34)
MCHC RBC AUTO-ENTMCNC: 33.3 G/DL (ref 31–37)
MCV RBC AUTO: 92.8 FL (ref 80–100)
MONOCYTES # BLD: 0.16 X10(3) UL (ref 0.1–1)
MONOCYTES NFR BLD: 4 %
MORPHOLOGY: NORMAL
NEUTROPHILS # BLD AUTO: 2.26 X10 (3) UL (ref 1.5–7.7)
NEUTROPHILS NFR BLD: 60 %
NEUTS BAND NFR BLD: 4 %
NEUTS HYPERSEG # BLD: 2.56 X10(3) UL (ref 1.5–7.7)
OSMOLALITY SERPL CALC.SUM OF ELEC: 290 MOSM/KG (ref 275–295)
PLATELET # BLD AUTO: 462 10(3)UL (ref 150–450)
PLATELET MORPHOLOGY: NORMAL
POTASSIUM SERPL-SCNC: 3.6 MMOL/L (ref 3.5–5.1)
PROT SERPL-MCNC: 7.2 G/DL (ref 5.7–8.2)
PROTHROMBIN TIME: 14.4 SECONDS (ref 11.6–14.8)
RBC # BLD AUTO: 4.3 X10(6)UL (ref 3.8–5.3)
SODIUM SERPL-SCNC: 139 MMOL/L (ref 136–145)
TOTAL CELLS COUNTED BLD: 100
WBC # BLD AUTO: 4 X10(3) UL (ref 4–11)

## 2025-07-29 PROCEDURE — 85007 BL SMEAR W/DIFF WBC COUNT: CPT

## 2025-07-29 PROCEDURE — 85025 COMPLETE CBC W/AUTO DIFF WBC: CPT

## 2025-07-29 PROCEDURE — 83735 ASSAY OF MAGNESIUM: CPT

## 2025-07-29 PROCEDURE — 85027 COMPLETE CBC AUTOMATED: CPT

## 2025-07-29 PROCEDURE — 80197 ASSAY OF TACROLIMUS: CPT

## 2025-07-29 PROCEDURE — 80053 COMPREHEN METABOLIC PANEL: CPT

## 2025-07-29 PROCEDURE — 85610 PROTHROMBIN TIME: CPT

## 2025-07-29 PROCEDURE — 36415 COLL VENOUS BLD VENIPUNCTURE: CPT

## 2025-08-01 LAB — TACROLIMUS LVL: 8.1 NG/ML

## 2025-08-02 ENCOUNTER — APPOINTMENT (OUTPATIENT)
Dept: GENERAL RADIOLOGY | Age: 55
End: 2025-08-02
Attending: EMERGENCY MEDICINE

## 2025-08-02 ENCOUNTER — HOSPITAL ENCOUNTER (OUTPATIENT)
Age: 55
Discharge: HOME | End: 2025-08-02
Attending: EMERGENCY MEDICINE

## 2025-08-02 VITALS
HEART RATE: 96 BPM | OXYGEN SATURATION: 98 % | DIASTOLIC BLOOD PRESSURE: 75 MMHG | SYSTOLIC BLOOD PRESSURE: 119 MMHG | TEMPERATURE: 98 F | RESPIRATION RATE: 18 BRPM

## 2025-08-02 DIAGNOSIS — J06.9 UPPER RESPIRATORY TRACT INFECTION, UNSPECIFIED TYPE: Primary | ICD-10-CM

## 2025-08-02 DIAGNOSIS — J90 PLEURAL EFFUSION: ICD-10-CM

## 2025-08-02 LAB
POCT INFLUENZA A: NEGATIVE
POCT INFLUENZA B: NEGATIVE
SARS-COV-2 RNA RESP QL NAA+PROBE: NOT DETECTED

## 2025-08-02 PROCEDURE — 99214 OFFICE O/P EST MOD 30 MIN: CPT

## 2025-08-02 PROCEDURE — 71046 X-RAY EXAM CHEST 2 VIEWS: CPT | Performed by: EMERGENCY MEDICINE

## 2025-08-02 PROCEDURE — 87502 INFLUENZA DNA AMP PROBE: CPT | Performed by: EMERGENCY MEDICINE

## 2025-08-02 RX ORDER — AZITHROMYCIN 250 MG/1
TABLET, FILM COATED ORAL
Qty: 6 TABLET | Refills: 0 | Status: SHIPPED | OUTPATIENT
Start: 2025-08-02 | End: 2025-08-07

## 2025-08-02 RX ORDER — CEFDINIR 300 MG/1
300 CAPSULE ORAL 2 TIMES DAILY
Qty: 14 CAPSULE | Refills: 0 | Status: SHIPPED | OUTPATIENT
Start: 2025-08-02 | End: 2025-08-09

## 2025-08-12 ENCOUNTER — LAB ENCOUNTER (OUTPATIENT)
Dept: LAB | Age: 55
End: 2025-08-12
Attending: INTERNAL MEDICINE

## 2025-08-12 DIAGNOSIS — Z94.4 STATUS POST LIVER TRANSPLANT (HCC): ICD-10-CM

## 2025-08-12 DIAGNOSIS — K74.69 FLORID CIRRHOSIS (HCC): ICD-10-CM

## 2025-08-12 DIAGNOSIS — K75.81 NONALCOHOLIC STEATOHEPATITIS: ICD-10-CM

## 2025-08-12 DIAGNOSIS — E83.42 HYPOMAGNESEMIA: ICD-10-CM

## 2025-08-12 DIAGNOSIS — Z01.818 PREOP EXAMINATION: ICD-10-CM

## 2025-08-12 DIAGNOSIS — Z51.81 ENCOUNTER FOR THERAPEUTIC DRUG MONITORING: ICD-10-CM

## 2025-08-12 DIAGNOSIS — D84.9 IMMUNOSUPPRESSION (HCC): ICD-10-CM

## 2025-08-12 LAB
ALBUMIN SERPL-MCNC: 4.3 G/DL (ref 3.2–4.8)
ALBUMIN/GLOB SERPL: 1.6 (ref 1–2)
ALP LIVER SERPL-CCNC: 703 U/L (ref 46–118)
ALT SERPL-CCNC: 29 U/L (ref 10–49)
ANION GAP SERPL CALC-SCNC: 15 MMOL/L (ref 0–18)
AST SERPL-CCNC: 41 U/L (ref ?–34)
BASOPHILS # BLD: 0.08 X10(3) UL (ref 0–0.2)
BASOPHILS NFR BLD: 2 %
BILIRUB SERPL-MCNC: 1.2 MG/DL (ref 0.3–1.2)
BUN BLD-MCNC: 14 MG/DL (ref 9–23)
CALCIUM BLD-MCNC: 9.9 MG/DL (ref 8.7–10.6)
CHLORIDE SERPL-SCNC: 99 MMOL/L (ref 98–112)
CO2 SERPL-SCNC: 25 MMOL/L (ref 21–32)
CREAT BLD-MCNC: 0.98 MG/DL (ref 0.55–1.02)
EGFRCR SERPLBLD CKD-EPI 2021: 68 ML/MIN/1.73M2 (ref 60–?)
EOSINOPHIL # BLD: 0.04 X10(3) UL (ref 0–0.7)
EOSINOPHIL NFR BLD: 1 %
ERYTHROCYTE [DISTWIDTH] IN BLOOD BY AUTOMATED COUNT: 14.4 %
FASTING STATUS PATIENT QL REPORTED: YES
GLOBULIN PLAS-MCNC: 2.7 G/DL (ref 2–3.5)
GLUCOSE BLD-MCNC: 98 MG/DL (ref 70–99)
HCT VFR BLD AUTO: 39.1 % (ref 35–48)
HGB BLD-MCNC: 13.1 G/DL (ref 12–16)
INR BLD: 1.12 (ref 0.8–1.2)
LYMPHOCYTES NFR BLD: 0.8 X10(3) UL (ref 1–4)
LYMPHOCYTES NFR BLD: 21 %
MAGNESIUM SERPL-MCNC: 1.8 MG/DL (ref 1.6–2.6)
MCH RBC QN AUTO: 30.9 PG (ref 26–34)
MCHC RBC AUTO-ENTMCNC: 33.5 G/DL (ref 31–37)
MCV RBC AUTO: 92.2 FL (ref 80–100)
METAMYELOCYTES # BLD: 0.04 X10(3) UL (ref ?–0.01)
METAMYELOCYTES NFR BLD: 1 %
MONOCYTES # BLD: 0.3 X10(3) UL (ref 0.1–1)
MONOCYTES NFR BLD: 8 %
MORPHOLOGY: NORMAL
NEUTROPHILS # BLD AUTO: 2.03 X10 (3) UL (ref 1.5–7.7)
NEUTROPHILS NFR BLD: 64 %
NEUTS BAND NFR BLD: 3 %
NEUTS HYPERSEG # BLD: 2.55 X10(3) UL (ref 1.5–7.7)
OSMOLALITY SERPL CALC.SUM OF ELEC: 288 MOSM/KG (ref 275–295)
PLATELET # BLD AUTO: 581 10(3)UL (ref 150–450)
PLATELET MORPHOLOGY: NORMAL
POTASSIUM SERPL-SCNC: 3.6 MMOL/L (ref 3.5–5.1)
PROT SERPL-MCNC: 7 G/DL (ref 5.7–8.2)
PROTHROMBIN TIME: 14.5 SECONDS (ref 11.6–14.8)
RBC # BLD AUTO: 4.24 X10(6)UL (ref 3.8–5.3)
SODIUM SERPL-SCNC: 139 MMOL/L (ref 136–145)
TOTAL CELLS COUNTED BLD: 100
WBC # BLD AUTO: 3.8 X10(3) UL (ref 4–11)

## 2025-08-12 PROCEDURE — 83735 ASSAY OF MAGNESIUM: CPT

## 2025-08-12 PROCEDURE — 85025 COMPLETE CBC W/AUTO DIFF WBC: CPT

## 2025-08-12 PROCEDURE — 85027 COMPLETE CBC AUTOMATED: CPT

## 2025-08-12 PROCEDURE — 80197 ASSAY OF TACROLIMUS: CPT

## 2025-08-12 PROCEDURE — 85007 BL SMEAR W/DIFF WBC COUNT: CPT

## 2025-08-12 PROCEDURE — 85610 PROTHROMBIN TIME: CPT

## 2025-08-12 PROCEDURE — 36415 COLL VENOUS BLD VENIPUNCTURE: CPT

## 2025-08-12 PROCEDURE — 80053 COMPREHEN METABOLIC PANEL: CPT

## 2025-08-15 LAB — TACROLIMUS LVL: 7.4 NG/ML

## 2025-08-18 ENCOUNTER — APPOINTMENT (OUTPATIENT)
Dept: SURGERY | Facility: CLINIC | Age: 55
End: 2025-08-18

## 2025-08-22 ENCOUNTER — TELEPHONE (OUTPATIENT)
Dept: INTERNAL MEDICINE CLINIC | Facility: CLINIC | Age: 55
End: 2025-08-22

## 2025-08-27 ENCOUNTER — LAB ENCOUNTER (OUTPATIENT)
Dept: LAB | Age: 55
End: 2025-08-27
Attending: INTERNAL MEDICINE

## 2025-08-27 DIAGNOSIS — E83.42 HYPOMAGNESEMIA: ICD-10-CM

## 2025-08-27 DIAGNOSIS — D84.9 IMMUNOSUPPRESSION (HCC): ICD-10-CM

## 2025-08-27 DIAGNOSIS — Z51.81 ENCOUNTER FOR THERAPEUTIC DRUG MONITORING: ICD-10-CM

## 2025-08-27 DIAGNOSIS — Z94.4 STATUS POST LIVER TRANSPLANT (HCC): ICD-10-CM

## 2025-08-27 DIAGNOSIS — K75.81 NONALCOHOLIC STEATOHEPATITIS: ICD-10-CM

## 2025-08-27 DIAGNOSIS — Z01.818 PREOP EXAMINATION: ICD-10-CM

## 2025-08-27 DIAGNOSIS — K74.69 FLORID CIRRHOSIS (HCC): ICD-10-CM

## 2025-08-27 LAB
ALBUMIN SERPL-MCNC: 4.4 G/DL (ref 3.2–4.8)
ALBUMIN/GLOB SERPL: 1.7 (ref 1–2)
ALP LIVER SERPL-CCNC: 654 U/L (ref 46–118)
ALT SERPL-CCNC: 34 U/L (ref 10–49)
ANION GAP SERPL CALC-SCNC: 16 MMOL/L (ref 0–18)
AST SERPL-CCNC: 50 U/L (ref ?–34)
BASOPHILS # BLD AUTO: 0.11 X10(3) UL (ref 0–0.2)
BASOPHILS NFR BLD AUTO: 2.6 %
BILIRUB SERPL-MCNC: 1.5 MG/DL (ref 0.3–1.2)
BUN BLD-MCNC: 12 MG/DL (ref 9–23)
CALCIUM BLD-MCNC: 10.1 MG/DL (ref 8.7–10.6)
CHLORIDE SERPL-SCNC: 99 MMOL/L (ref 98–112)
CO2 SERPL-SCNC: 24 MMOL/L (ref 21–32)
CREAT BLD-MCNC: 0.89 MG/DL (ref 0.55–1.02)
EGFRCR SERPLBLD CKD-EPI 2021: 76 ML/MIN/1.73M2 (ref 60–?)
EOSINOPHIL # BLD AUTO: 0.06 X10(3) UL (ref 0–0.7)
EOSINOPHIL NFR BLD AUTO: 1.4 %
ERYTHROCYTE [DISTWIDTH] IN BLOOD BY AUTOMATED COUNT: 14.9 %
FASTING STATUS PATIENT QL REPORTED: YES
GLOBULIN PLAS-MCNC: 2.6 G/DL (ref 2–3.5)
GLUCOSE BLD-MCNC: 102 MG/DL (ref 70–99)
HCT VFR BLD AUTO: 37.7 % (ref 35–48)
HGB BLD-MCNC: 13.1 G/DL (ref 12–16)
IMM GRANULOCYTES # BLD AUTO: 0.05 X10(3) UL (ref 0–1)
IMM GRANULOCYTES NFR BLD: 1.2 %
INR BLD: 1.1 (ref 0.8–1.2)
LYMPHOCYTES # BLD AUTO: 1.08 X10(3) UL (ref 1–4)
LYMPHOCYTES NFR BLD AUTO: 25.3 %
MAGNESIUM SERPL-MCNC: 1.7 MG/DL (ref 1.6–2.6)
MCH RBC QN AUTO: 31.1 PG (ref 26–34)
MCHC RBC AUTO-ENTMCNC: 34.7 G/DL (ref 31–37)
MCV RBC AUTO: 89.5 FL (ref 80–100)
MONOCYTES # BLD AUTO: 0.44 X10(3) UL (ref 0.1–1)
MONOCYTES NFR BLD AUTO: 10.3 %
NEUTROPHILS # BLD AUTO: 2.53 X10 (3) UL (ref 1.5–7.7)
NEUTROPHILS # BLD AUTO: 2.53 X10(3) UL (ref 1.5–7.7)
NEUTROPHILS NFR BLD AUTO: 59.2 %
OSMOLALITY SERPL CALC.SUM OF ELEC: 288 MOSM/KG (ref 275–295)
PLATELET # BLD AUTO: 511 10(3)UL (ref 150–450)
POTASSIUM SERPL-SCNC: 3.7 MMOL/L (ref 3.5–5.1)
PROT SERPL-MCNC: 7 G/DL (ref 5.7–8.2)
PROTHROMBIN TIME: 14.6 SECONDS (ref 11.6–14.8)
RBC # BLD AUTO: 4.21 X10(6)UL (ref 3.8–5.3)
SODIUM SERPL-SCNC: 139 MMOL/L (ref 136–145)
WBC # BLD AUTO: 4.3 X10(3) UL (ref 4–11)

## 2025-08-27 PROCEDURE — 85610 PROTHROMBIN TIME: CPT

## 2025-08-27 PROCEDURE — 80197 ASSAY OF TACROLIMUS: CPT

## 2025-08-27 PROCEDURE — 83735 ASSAY OF MAGNESIUM: CPT

## 2025-08-27 PROCEDURE — 36415 COLL VENOUS BLD VENIPUNCTURE: CPT

## 2025-08-27 PROCEDURE — 80053 COMPREHEN METABOLIC PANEL: CPT

## 2025-08-27 PROCEDURE — 85025 COMPLETE CBC W/AUTO DIFF WBC: CPT

## 2025-08-29 ENCOUNTER — TELEPHONE (OUTPATIENT)
Dept: INTERNAL MEDICINE CLINIC | Facility: CLINIC | Age: 55
End: 2025-08-29

## 2025-08-30 LAB — TACROLIMUS LVL: 4.7 NG/ML

## (undated) DIAGNOSIS — K70.30 ALCOHOLIC CIRRHOSIS, UNSPECIFIED WHETHER ASCITES PRESENT (HCC): ICD-10-CM

## (undated) DIAGNOSIS — E03.9 HYPOTHYROIDISM, UNSPECIFIED TYPE: Primary | ICD-10-CM

## (undated) DEVICE — Device: Brand: DEFENDO AIR/WATER/SUCTION AND BIOPSY VALVE

## (undated) DEVICE — SNARE 9MM 230CM 2.4MM EXACTO

## (undated) DEVICE — 6 ML SYRINGE LUER-LOCK TIP: Brand: MONOJECT

## (undated) DEVICE — 3 ML SYRINGE LUER-LOCK TIP: Brand: MONOJECT

## (undated) DEVICE — CONMED SCOPE SAVER BITE BLOCK, 20X27 MM: Brand: SCOPE SAVER

## (undated) DEVICE — 3M™ RED DOT™ MONITORING ELECTRODE WITH FOAM TAPE AND STICKY GEL, 50/BAG, 20/CASE, 72/PLT 2570: Brand: RED DOT™

## (undated) DEVICE — FILTERLINE NASAL ADULT O2/CO2

## (undated) DEVICE — 1200CC GUARDIAN II: Brand: GUARDIAN

## (undated) DEVICE — MEDI-VAC NON-CONDUCTIVE SUCTION TUBING 6MM X 1.8M (6FT.) L: Brand: CARDINAL HEALTH

## (undated) DEVICE — LINE MNTR ADLT SET O2 INTMD

## (undated) DEVICE — ENDOSCOPY PACK - LOWER: Brand: MEDLINE INDUSTRIES, INC.

## (undated) DEVICE — TRAP SPEC REMOVAL ETRAP 15CM

## (undated) DEVICE — Device: Brand: CUSTOM PROCEDURE KIT

## (undated) DEVICE — FORCEP RADIAL JAW 4

## (undated) NOTE — LETTER
01/10/22        [address] [address]      Dear Rosario Weiss records indicate that you have outstanding lab work and or testing that was ordered for you and has not yet been completed:  Orders Placed This Encounte

## (undated) NOTE — LETTER
01/04/21        [address] [address]      Dear José Miguel Rear records indicate that you have outstanding lab work and or testing that was ordered for you and has not yet been completed:  Orders Placed This Encounte

## (undated) NOTE — LETTER
[address] [place] [address]  Authorization for Invasive Procedures  1.  I hereby authorize Dr. Humaira Stroud , my physician and whomever may be designated as the doctor's assistant, to perform the following operation and/or procedure:  E occur: fever and allergic reactions, hemolytic reactions, transmission of disease such as hepatitis, AIDS, cytomegalovirus (CMV), and flluid overload.  In the event that I wish to have autologous transfusions of my own blood, or a directed donor transfusion Signature of Patient:  ________________________________________________ Date: _________Time: _________    Responsible person in case of minor or unconscious: _____________________________Relationship: ____________     Witness Signature: _______________

## (undated) NOTE — LETTER
[facility]  Administration of Anesthesia  1. Ortega Longoria, or _________________________________ acting on her behalf, (Patient) (Dependent/Representative) request to receive anesthesia for my pending procedure/operation/treatment.   A bleeding, seizure, cardiac arrest and death. 7. AWARENESS: I understand that it is possible (but unlikely) to have explicit memory of events from the operating room while under general anesthesia.   8. ELECTROCONVULSIVE THERAPY PATIENTS: This consent serve below affirms that prior to the time of the procedure, I have explained to the patient and/or his/her guardian, the risks and benefits of undergoing anesthesia, as well as any reasonable alternatives.     ___________________________________________________

## (undated) NOTE — LETTER
Date: 1/24/2018    Patient Name: Melba Scott          To Whom it may concern: The above patient was seen at the [facility] for treatment of a medical condition- Knee Pain/Strain.     This patient should be excused from attending work f

## (undated) NOTE — LETTER
06/07/19        [address]  [address] [address]      Dear Glenda Good,    [address] [facility] records indicate that you have outstanding lab work and or testing that was ordered for you and has not yet been completed: Non fasting lab work (Repeat

## (undated) NOTE — LETTER
February 24, 2020    [address] [employer]  [address] [address]      Dear [facility]: The following are the results of your recent tests. Please review the list of test results.   Your result is the value on the left; we have also supplie

## (undated) NOTE — LETTER
01/09/20        [address] [facility]  [address] [address]      Dear De Novak records indicate that you have outstanding lab work and or testing that was ordered for you and has not yet been completed:  Orders Placed This Encounte

## (undated) NOTE — Clinical Note
Thank you for referring Ashok Gusman to the [facility]. Consult was completed today via person. I have ordered lab, referred for a nutrition consultation with our dietician. I counseled on the importance of lifestyle intervention in adjunct with medication and started [facility] for treatment with follow-up advised in about 8 weeks. If cost/supply prohibitive plan Metformin ER and further consider new FDA approved agent Zepbound once made available for prescribing.

## (undated) NOTE — LETTER
06/10/22        [address]  [address] [address]      Dear Ivana Easley,    [address] [facility] records indicate that you have outstanding lab work and or testing that was ordered for you and has not yet been completed:  Orders Placed This Encounter      TSH W REFLEX TO FREE T4 [[id number]][Q]    To provide you with the best possible care, please complete these orders at your earliest convenience. If you have recently completed these orders please disregard this letter. If you have any questions please call the office at Dept: [phone].      Thank you,       YURI Márquez